# Patient Record
Sex: FEMALE | Race: WHITE | NOT HISPANIC OR LATINO | Employment: FULL TIME | URBAN - METROPOLITAN AREA
[De-identification: names, ages, dates, MRNs, and addresses within clinical notes are randomized per-mention and may not be internally consistent; named-entity substitution may affect disease eponyms.]

---

## 2018-03-05 ENCOUNTER — CONVERSION ENCOUNTER (OUTPATIENT)
Dept: FAMILY MEDICINE CLINIC | Facility: CLINIC | Age: 18
End: 2018-03-05

## 2018-03-05 ENCOUNTER — OFFICE VISIT CONVERTED (OUTPATIENT)
Dept: FAMILY MEDICINE CLINIC | Facility: CLINIC | Age: 18
End: 2018-03-05
Attending: NURSE PRACTITIONER

## 2018-08-07 ENCOUNTER — OFFICE VISIT CONVERTED (OUTPATIENT)
Dept: FAMILY MEDICINE CLINIC | Facility: CLINIC | Age: 18
End: 2018-08-07
Attending: PHYSICIAN ASSISTANT

## 2018-08-07 ENCOUNTER — CONVERSION ENCOUNTER (OUTPATIENT)
Dept: FAMILY MEDICINE CLINIC | Facility: CLINIC | Age: 18
End: 2018-08-07

## 2018-10-30 ENCOUNTER — OFFICE VISIT CONVERTED (OUTPATIENT)
Dept: FAMILY MEDICINE CLINIC | Facility: CLINIC | Age: 18
End: 2018-10-30
Attending: PHYSICIAN ASSISTANT

## 2019-01-15 ENCOUNTER — HOSPITAL ENCOUNTER (OUTPATIENT)
Dept: URGENT CARE | Facility: CLINIC | Age: 19
Discharge: HOME | End: 2019-01-15
Attending: FAMILY MEDICINE

## 2019-01-24 ENCOUNTER — HOSPITAL ENCOUNTER (OUTPATIENT)
Dept: SLEEP MEDICINE | Facility: HOSPITAL | Age: 19
Discharge: HOME | End: 2019-01-24
Attending: INTERNAL MEDICINE

## 2019-02-07 ENCOUNTER — OFFICE VISIT CONVERTED (OUTPATIENT)
Dept: FAMILY MEDICINE CLINIC | Facility: CLINIC | Age: 19
End: 2019-02-07
Attending: PHYSICIAN ASSISTANT

## 2019-02-07 ENCOUNTER — HOSPITAL ENCOUNTER (OUTPATIENT)
Dept: GENERAL RADIOLOGY | Facility: HOSPITAL | Age: 19
Discharge: HOME | End: 2019-02-07
Attending: PHYSICIAN ASSISTANT

## 2019-04-18 ENCOUNTER — HOSPITAL ENCOUNTER (OUTPATIENT)
Dept: SLEEP MEDICINE | Facility: HOSPITAL | Age: 19
Discharge: HOME | End: 2019-04-18
Attending: INTERNAL MEDICINE

## 2019-04-26 ENCOUNTER — HOSPITAL ENCOUNTER (OUTPATIENT)
Dept: URGENT CARE | Facility: CLINIC | Age: 19
Discharge: HOME | End: 2019-04-26

## 2019-04-28 LAB — BACTERIA UR CULT: NORMAL

## 2019-05-01 ENCOUNTER — HOSPITAL ENCOUNTER (OUTPATIENT)
Dept: URGENT CARE | Facility: CLINIC | Age: 19
Discharge: HOME | End: 2019-05-01
Attending: FAMILY MEDICINE

## 2019-05-05 ENCOUNTER — HOSPITAL ENCOUNTER (OUTPATIENT)
Dept: URGENT CARE | Facility: CLINIC | Age: 19
Discharge: HOME | End: 2019-05-05

## 2019-05-15 ENCOUNTER — HOSPITAL ENCOUNTER (OUTPATIENT)
Dept: URGENT CARE | Facility: CLINIC | Age: 19
Discharge: HOME | End: 2019-05-15
Attending: NURSE PRACTITIONER

## 2019-06-08 ENCOUNTER — APPOINTMENT (OUTPATIENT)
Dept: GENERAL RADIOLOGY | Facility: HOSPITAL | Age: 19
End: 2019-06-08

## 2019-06-08 ENCOUNTER — HOSPITAL ENCOUNTER (EMERGENCY)
Facility: HOSPITAL | Age: 19
Discharge: HOME | End: 2019-06-08
Attending: EMERGENCY MEDICINE | Admitting: EMERGENCY MEDICINE

## 2019-06-08 VITALS
OXYGEN SATURATION: 99 % | HEART RATE: 108 BPM | TEMPERATURE: 97.8 F | SYSTOLIC BLOOD PRESSURE: 112 MMHG | DIASTOLIC BLOOD PRESSURE: 68 MMHG | HEIGHT: 62 IN | RESPIRATION RATE: 18 BRPM | BODY MASS INDEX: 21.57 KG/M2 | WEIGHT: 117.2 LBS

## 2019-06-08 DIAGNOSIS — J45.901 MODERATE ASTHMA WITH ACUTE EXACERBATION, UNSPECIFIED WHETHER PERSISTENT: Primary | ICD-10-CM

## 2019-06-08 LAB
ALBUMIN SERPL-MCNC: 4.8 G/DL (ref 3.5–5.2)
ALBUMIN/GLOB SERPL: 2.1 G/DL
ALP SERPL-CCNC: 50 U/L (ref 39–117)
ALT SERPL W P-5'-P-CCNC: 9 U/L (ref 1–33)
ANION GAP SERPL CALCULATED.3IONS-SCNC: 14.4 MMOL/L
AST SERPL-CCNC: 16 U/L (ref 1–32)
BASOPHILS # BLD AUTO: 0.07 10*3/MM3 (ref 0–0.2)
BASOPHILS NFR BLD AUTO: 0.4 % (ref 0–1.5)
BILIRUB SERPL-MCNC: 0.3 MG/DL (ref 0.2–1.2)
BUN BLD-MCNC: 4 MG/DL (ref 6–20)
BUN/CREAT SERPL: 6.8 (ref 7–25)
CALCIUM SPEC-SCNC: 8.7 MG/DL (ref 8.6–10.5)
CHLORIDE SERPL-SCNC: 102 MMOL/L (ref 98–107)
CO2 SERPL-SCNC: 24.6 MMOL/L (ref 22–29)
CREAT BLD-MCNC: 0.59 MG/DL (ref 0.57–1)
DEPRECATED RDW RBC AUTO: 47 FL (ref 37–54)
EOSINOPHIL # BLD AUTO: 0.08 10*3/MM3 (ref 0–0.4)
EOSINOPHIL NFR BLD AUTO: 0.4 % (ref 0.3–6.2)
ERYTHROCYTE [DISTWIDTH] IN BLOOD BY AUTOMATED COUNT: 13.6 % (ref 12.3–15.4)
GFR SERPL CREATININE-BSD FRML MDRD: 129 ML/MIN/1.73
GLOBULIN UR ELPH-MCNC: 2.3 GM/DL
GLUCOSE BLD-MCNC: 88 MG/DL (ref 65–99)
HCT VFR BLD AUTO: 38.5 % (ref 34–46.6)
HGB BLD-MCNC: 13.1 G/DL (ref 12–15.9)
IMM GRANULOCYTES # BLD AUTO: 0.1 10*3/MM3 (ref 0–0.05)
IMM GRANULOCYTES NFR BLD AUTO: 0.6 % (ref 0–0.5)
LYMPHOCYTES # BLD AUTO: 1.63 10*3/MM3 (ref 0.7–3.1)
LYMPHOCYTES NFR BLD AUTO: 9.1 % (ref 19.6–45.3)
MCH RBC QN AUTO: 32 PG (ref 26.6–33)
MCHC RBC AUTO-ENTMCNC: 34 G/DL (ref 31.5–35.7)
MCV RBC AUTO: 93.9 FL (ref 79–97)
MONOCYTES # BLD AUTO: 1.02 10*3/MM3 (ref 0.1–0.9)
MONOCYTES NFR BLD AUTO: 5.7 % (ref 5–12)
NEUTROPHILS # BLD AUTO: 14.97 10*3/MM3 (ref 1.7–7)
NEUTROPHILS NFR BLD AUTO: 83.8 % (ref 42.7–76)
NRBC BLD AUTO-RTO: 0 /100 WBC (ref 0–0.2)
PLATELET # BLD AUTO: 244 10*3/MM3 (ref 140–450)
PMV BLD AUTO: 10.3 FL (ref 6–12)
POTASSIUM BLD-SCNC: 3.1 MMOL/L (ref 3.5–5.2)
PROT SERPL-MCNC: 7.1 G/DL (ref 6–8.5)
RBC # BLD AUTO: 4.1 10*6/MM3 (ref 3.77–5.28)
SODIUM BLD-SCNC: 141 MMOL/L (ref 136–145)
WBC NRBC COR # BLD: 17.87 10*3/MM3 (ref 3.4–10.8)

## 2019-06-08 PROCEDURE — 25010000002 METHYLPREDNISOLONE PER 125 MG: Performed by: EMERGENCY MEDICINE

## 2019-06-08 PROCEDURE — 71046 X-RAY EXAM CHEST 2 VIEWS: CPT

## 2019-06-08 PROCEDURE — 80053 COMPREHEN METABOLIC PANEL: CPT | Performed by: EMERGENCY MEDICINE

## 2019-06-08 PROCEDURE — 85025 COMPLETE CBC W/AUTO DIFF WBC: CPT | Performed by: EMERGENCY MEDICINE

## 2019-06-08 PROCEDURE — 94640 AIRWAY INHALATION TREATMENT: CPT

## 2019-06-08 PROCEDURE — 94799 UNLISTED PULMONARY SVC/PX: CPT

## 2019-06-08 PROCEDURE — 96374 THER/PROPH/DIAG INJ IV PUSH: CPT

## 2019-06-08 PROCEDURE — 99283 EMERGENCY DEPT VISIT LOW MDM: CPT

## 2019-06-08 RX ORDER — PREDNISONE 20 MG/1
20 TABLET ORAL 2 TIMES DAILY
Qty: 10 TABLET | Refills: 0 | OUTPATIENT
Start: 2019-06-08 | End: 2019-10-22

## 2019-06-08 RX ORDER — POTASSIUM CHLORIDE 750 MG/1
40 CAPSULE, EXTENDED RELEASE ORAL ONCE
Status: COMPLETED | OUTPATIENT
Start: 2019-06-08 | End: 2019-06-08

## 2019-06-08 RX ORDER — IPRATROPIUM BROMIDE AND ALBUTEROL SULFATE 2.5; .5 MG/3ML; MG/3ML
3 SOLUTION RESPIRATORY (INHALATION) ONCE
Status: COMPLETED | OUTPATIENT
Start: 2019-06-08 | End: 2019-06-08

## 2019-06-08 RX ORDER — SODIUM CHLORIDE 0.9 % (FLUSH) 0.9 %
10 SYRINGE (ML) INJECTION AS NEEDED
Status: DISCONTINUED | OUTPATIENT
Start: 2019-06-08 | End: 2019-06-08 | Stop reason: HOSPADM

## 2019-06-08 RX ORDER — METHYLPREDNISOLONE SODIUM SUCCINATE 125 MG/2ML
125 INJECTION, POWDER, LYOPHILIZED, FOR SOLUTION INTRAMUSCULAR; INTRAVENOUS ONCE
Status: COMPLETED | OUTPATIENT
Start: 2019-06-08 | End: 2019-06-08

## 2019-06-08 RX ORDER — ALBUTEROL SULFATE 2.5 MG/3ML
2.5 SOLUTION RESPIRATORY (INHALATION)
Status: DISCONTINUED | OUTPATIENT
Start: 2019-06-08 | End: 2019-06-08

## 2019-06-08 RX ADMIN — METHYLPREDNISOLONE SODIUM SUCCINATE 125 MG: 125 INJECTION, POWDER, FOR SOLUTION INTRAMUSCULAR; INTRAVENOUS at 07:25

## 2019-06-08 RX ADMIN — ALBUTEROL SULFATE 2.5 MG: 2.5 SOLUTION RESPIRATORY (INHALATION) at 08:25

## 2019-06-08 RX ADMIN — IPRATROPIUM BROMIDE AND ALBUTEROL SULFATE 3 ML: 2.5; .5 SOLUTION RESPIRATORY (INHALATION) at 08:21

## 2019-06-08 RX ADMIN — POTASSIUM CHLORIDE 40 MEQ: 750 CAPSULE, EXTENDED RELEASE ORAL at 09:42

## 2019-06-08 NOTE — ED PROVIDER NOTES
EMERGENCY DEPARTMENT ENCOUNTER    CHIEF COMPLAINT  Chief Complaint: SOA  History given by: patient  History limited by: none  Room Number: 15/15  PMD: Provider, No Known      HPI:  Pt is a 19 y.o. female who presents with hx of asthma complaining of SOA that began yesterday morning. Pt states sx worsened a few hours PTA. She states that sx are worse with exertion and laying flat.  Pt also complains of productive cough with clear sputum and sore throat. Pt denies N/V, rhinorrhea, BLE edema, BLE pain, rash, fever and chills. Pt states she has a nebulizer and inhalers at home which she has used multiple times with minimal relief. Pt is a former smoker and states she quit last year. Pt drinks EtOH occasionally. Pt denies ill contacts or any asthma triggers. Pt states there is no chance she could be pregnant.     Duration:  One day  Onset: gradual  Timing: constant  Location: chest  Intensity/Severity: moderate  Progression: worsening  Associated Symptoms: productive cough with clear sputum and sore throat  Aggravating Factors: exertion and laying flat  Alleviating Factors: none  Previous Episodes: hx of asthma  Treatment before arrival: Pt states she has a nebulizer and inhalers at home which she has used multiple times with minimal relief.    PAST MEDICAL HISTORY  Active Ambulatory Problems     Diagnosis Date Noted   • No Active Ambulatory Problems     Resolved Ambulatory Problems     Diagnosis Date Noted   • No Resolved Ambulatory Problems     No Additional Past Medical History       PAST SURGICAL HISTORY  No past surgical history on file.    FAMILY HISTORY  No family history on file.    SOCIAL HISTORY  Social History     Socioeconomic History   • Marital status:      Spouse name: Not on file   • Number of children: Not on file   • Years of education: Not on file   • Highest education level: Not on file       ALLERGIES  Patient has no known allergies.    REVIEW OF SYSTEMS  Review of Systems   Constitutional:  Negative for fever.   HENT: Positive for sore throat.    Eyes: Negative.    Respiratory: Positive for cough (productive-clear sputum) and shortness of breath.    Cardiovascular: Negative for chest pain.   Gastrointestinal: Negative for abdominal pain, diarrhea and vomiting.   Genitourinary: Negative for dysuria.   Musculoskeletal: Negative for neck pain.   Skin: Negative for rash.   Allergic/Immunologic: Negative.    Neurological: Negative for weakness, numbness and headaches.   Hematological: Negative.    Psychiatric/Behavioral: Negative.    All other systems reviewed and are negative.      PHYSICAL EXAM  ED Triage Vitals   Temp Heart Rate Resp BP SpO2   06/08/19 0644 06/08/19 0644 06/08/19 0644 06/08/19 0658 06/08/19 0644   97.8 °F (36.6 °C) (!) 134 22 135/81 96 %      Temp src Heart Rate Source Patient Position BP Location FiO2 (%)   06/08/19 0644 06/08/19 0821 06/08/19 0658 06/08/19 0658 --   Tympanic Monitor Lying Right arm          Physical Exam   Constitutional: She is oriented to person, place, and time. She appears distressed (mild).   HENT:   Head: Normocephalic and atraumatic.   Mouth/Throat: Oropharynx is clear and moist.   Eyes: EOM are normal. Pupils are equal, round, and reactive to light.   Neck: Normal range of motion. Neck supple.   Cardiovascular: Regular rhythm and normal heart sounds. Tachycardia present.   Pulmonary/Chest: Tachypnea noted. She is in respiratory distress (mild). She has wheezes (diffuse). She has rhonchi (diffuse).   Nasal flaring   Abdominal: Soft. Bowel sounds are normal. She exhibits no distension. There is no tenderness. There is no rebound and no guarding.   Musculoskeletal: Normal range of motion. She exhibits no edema (BLE) or tenderness (calf).   Lymphadenopathy:     She has no cervical adenopathy.   Neurological: She is alert and oriented to person, place, and time. She has normal sensation and normal strength.   Skin: Skin is warm and dry. No rash noted.   Psychiatric:  Mood and affect normal.   Nursing note and vitals reviewed.      LAB RESULTS  Lab Results (last 24 hours)     Procedure Component Value Units Date/Time    CBC & Differential [[id number]] Collected:  06/08/19 0710    Specimen:  Blood Updated:  06/08/19 0730    Narrative:       The following orders were created for panel order CBC & Differential.  Procedure                               Abnormality         Status                     ---------                               -----------         ------                     CBC Auto Differential[[id number]]        Abnormal            Final result                 Please view results for these tests on the individual orders.    Comprehensive Metabolic Panel [[id number]]  (Abnormal) Collected:  06/08/19 0710    Specimen:  Blood Updated:  06/08/19 0741     Glucose 88 mg/dL      BUN 4 mg/dL      Creatinine 0.59 mg/dL      Sodium 141 mmol/L      Potassium 3.1 mmol/L      Chloride 102 mmol/L      CO2 24.6 mmol/L      Calcium 8.7 mg/dL      Total Protein 7.1 g/dL      Albumin 4.80 g/dL      ALT (SGPT) 9 U/L      AST (SGOT) 16 U/L      Alkaline Phosphatase 50 U/L      Total Bilirubin 0.3 mg/dL      eGFR Non African Amer 129 mL/min/1.73      Globulin 2.3 gm/dL      A/G Ratio 2.1 g/dL      BUN/Creatinine Ratio 6.8     Anion Gap 14.4 mmol/L     Narrative:       GFR Normal >60  Chronic Kidney Disease <60  Kidney Failure <15    CBC Auto Differential [[id number]]  (Abnormal) Collected:  06/08/19 0710    Specimen:  Blood Updated:  06/08/19 0730     WBC 17.87 10*3/mm3      RBC 4.10 10*6/mm3      Hemoglobin 13.1 g/dL      Hematocrit 38.5 %      MCV 93.9 fL      MCH 32.0 pg      MCHC 34.0 g/dL      RDW 13.6 %      RDW-SD 47.0 fl      MPV 10.3 fL      Platelets 244 10*3/mm3      Neutrophil % 83.8 %      Lymphocyte % 9.1 %      Monocyte % 5.7 %      Eosinophil % 0.4 %      Basophil % 0.4 %      Immature Grans % 0.6 %      Neutrophils, Absolute 14.97 10*3/mm3      Lymphocytes, Absolute 1.63 10*3/mm3       Monocytes, Absolute 1.02 10*3/mm3      Eosinophils, Absolute 0.08 10*3/mm3      Basophils, Absolute 0.07 10*3/mm3      Immature Grans, Absolute 0.10 10*3/mm3      nRBC 0.0 /100 WBC           I ordered the above labs and reviewed the results    RADIOLOGY  XR Chest 2 View   Final Result   No focal pulmonary consolidation. Mild pulmonary   hyperinflation. Follow-up as clinical indications persist.       This report was finalized on 6/8/2019 9:16 AM by Dr. Suman Marmolejo M.D.               I ordered the above noted radiological studies. Interpreted by radiologist. Reviewed by me in PACS.       PROCEDURES  Procedures      PROGRESS AND CONSULTS     0705-Ordered lab work and CXR for further evaluation. Ordered solu-medrol, proventil, and duo-neb for SOA and wheezing.     0800-Ordered micro-k for hypokalemia.     0837-Rechecked pt. Pt is resting comfortably and states that sx are improved after 2 breathing treatments. On re-exam, lungs are clear and heart is tachycardic. Notified pt of WBC-17.87. Pt denies recent steroid use. Discussed the plan to review CXR. Pt understands and agrees with the plan, all questions answered.    0931-Rechecked pt. Pt is resting comfortably. Notified pt of CXR which is negative acute. On exam, lungs are clear without wheezing. Discussed the plan to discharge the pt home with prescriptions for prednisone. I instructed the pt to f/u with her PCP and continue to use her nebulizer and inhalers as instructed. Pt understands and agrees with the plan, all questions answered.        MEDICAL DECISION MAKING  Results were reviewed/discussed with the patient and they were also made aware of online access. Pt also made aware that some labs, such as cultures, will not be resulted during ER visit and follow up with PMD is necessary.     MDM  Number of Diagnoses or Management Options     Amount and/or Complexity of Data Reviewed  Clinical lab tests: reviewed and ordered  (WBC-17.87  creatinine-0.59  hemoglobin-13.1  potassium-3.1)  Tests in the radiology section of CPT®: reviewed and ordered (CXR-negative)  Decide to obtain previous medical records or to obtain history from someone other than the patient: yes  Independent visualization of images, tracings, or specimens: yes           DIAGNOSIS  Final diagnoses:   Moderate asthma with acute exacerbation, unspecified whether persistent       DISPOSITION  DISCHARGE    Patient discharged in stable condition.    Reviewed implications of results, diagnosis, meds, responsibility to follow up, warning signs and symptoms of possible worsening, potential complications and reasons to return to ER.    Patient/Family voiced understanding of above instructions.    Discussed plan for discharge, as there is no emergent indication for admission. Patient referred to primary care provider for BP management due to today's BP. Pt/family is agreeable and understands need for follow up and repeat testing.  Pt is aware that discharge does not mean that nothing is wrong but it indicates no emergency is present that requires admission and they must continue care with follow-up as given below or physician of their choice.     FOLLOW-UP  PATIENT LIAISON [place] [id number] [phone]  Schedule an appointment as soon as possible for a visit            Medication List      New Prescriptions    predniSONE 20 MG tablet  Commonly known as:  DELTASONE  Take 1 tablet by mouth 2 (Two) Times a Day.              Latest Documented Vital Signs:  As of 9:35 AM  BP- 131/79 HR- 106 Temp- 97.8 °F (36.6 °C) (Tympanic) O2 sat- 100%    --  Documentation assistance provided by vita Benites for MD Kingsley.  Information recorded by the scribe was done at my direction and has been verified and validated by me.                    Lila Benites  06/08/19 7197       Ramon Adhikari MD  06/08/19 8323

## 2019-06-08 NOTE — DISCHARGE INSTRUCTIONS
Take medications as directed and follow up with your family doctor.  To new home breathing treatments every 4-6 hours as needed.  Please return to the emergency department if symptoms worsen with increasing shortness of breath.

## 2019-06-08 NOTE — ED TRIAGE NOTES
Pt c/o shortness of breath, states she has had a recent chest cold that has made her asthma worse. Audible wheezing noted. Pt states she attempted inhalers and neb treatments at home with no relief of symptoms.

## 2019-06-24 ENCOUNTER — HOSPITAL ENCOUNTER (EMERGENCY)
Facility: HOSPITAL | Age: 19
Discharge: HOME | End: 2019-06-24
Attending: EMERGENCY MEDICINE | Admitting: EMERGENCY MEDICINE

## 2019-06-24 VITALS
HEART RATE: 101 BPM | TEMPERATURE: 97.4 F | SYSTOLIC BLOOD PRESSURE: 121 MMHG | DIASTOLIC BLOOD PRESSURE: 70 MMHG | BODY MASS INDEX: 22.08 KG/M2 | RESPIRATION RATE: 16 BRPM | HEIGHT: 62 IN | OXYGEN SATURATION: 98 % | WEIGHT: 120 LBS

## 2019-06-24 DIAGNOSIS — L02.32 BOIL OF BUTTOCK: Primary | ICD-10-CM

## 2019-06-24 PROCEDURE — 99282 EMERGENCY DEPT VISIT SF MDM: CPT

## 2019-06-24 RX ORDER — DEXTROAMPHETAMINE SACCHARATE, AMPHETAMINE ASPARTATE, DEXTROAMPHETAMINE SULFATE AND AMPHETAMINE SULFATE 2.5; 2.5; 2.5; 2.5 MG/1; MG/1; MG/1; MG/1
20 TABLET ORAL 3 TIMES DAILY
COMMUNITY
End: 2020-04-19 | Stop reason: HOSPADM

## 2019-06-24 RX ORDER — SULFAMETHOXAZOLE AND TRIMETHOPRIM 800; 160 MG/1; MG/1
2 TABLET ORAL 2 TIMES DAILY
Qty: 40 TABLET | Refills: 0 | Status: SHIPPED | OUTPATIENT
Start: 2019-06-24 | End: 2019-08-02

## 2019-06-25 ENCOUNTER — HOSPITAL ENCOUNTER (OUTPATIENT)
Dept: URGENT CARE | Facility: CLINIC | Age: 19
Discharge: HOME | End: 2019-06-25

## 2019-06-27 ENCOUNTER — HOSPITAL ENCOUNTER (OUTPATIENT)
Dept: URGENT CARE | Facility: CLINIC | Age: 19
Discharge: HOME | End: 2019-06-27

## 2019-08-02 ENCOUNTER — HOSPITAL ENCOUNTER (EMERGENCY)
Facility: HOSPITAL | Age: 19
Discharge: HOME | End: 2019-08-02
Attending: EMERGENCY MEDICINE | Admitting: EMERGENCY MEDICINE

## 2019-08-02 VITALS
RESPIRATION RATE: 16 BRPM | HEIGHT: 62 IN | DIASTOLIC BLOOD PRESSURE: 74 MMHG | OXYGEN SATURATION: 99 % | TEMPERATURE: 98 F | WEIGHT: 120.7 LBS | BODY MASS INDEX: 22.21 KG/M2 | SYSTOLIC BLOOD PRESSURE: 112 MMHG | HEART RATE: 75 BPM

## 2019-08-02 DIAGNOSIS — R11.2 NON-INTRACTABLE VOMITING WITH NAUSEA, UNSPECIFIED VOMITING TYPE: Primary | ICD-10-CM

## 2019-08-02 LAB
ALBUMIN SERPL-MCNC: 4.7 G/DL (ref 3.5–5.2)
ALBUMIN/GLOB SERPL: 2.4 G/DL
ALP SERPL-CCNC: 42 U/L (ref 39–117)
ALT SERPL W P-5'-P-CCNC: 7 U/L (ref 1–33)
ANION GAP SERPL CALCULATED.3IONS-SCNC: 10.8 MMOL/L (ref 5–15)
AST SERPL-CCNC: 14 U/L (ref 1–32)
BACTERIA UR QL AUTO: ABNORMAL /HPF
BASOPHILS # BLD AUTO: 0.04 10*3/MM3 (ref 0–0.2)
BASOPHILS NFR BLD AUTO: 0.6 % (ref 0–1.5)
BILIRUB SERPL-MCNC: 0.7 MG/DL (ref 0.2–1.2)
BILIRUB UR QL STRIP: NEGATIVE
BUN BLD-MCNC: 7 MG/DL (ref 6–20)
BUN/CREAT SERPL: 9.2 (ref 7–25)
CALCIUM SPEC-SCNC: 9 MG/DL (ref 8.6–10.5)
CHLORIDE SERPL-SCNC: 106 MMOL/L (ref 98–107)
CLARITY UR: CLEAR
CO2 SERPL-SCNC: 26.2 MMOL/L (ref 22–29)
COLOR UR: YELLOW
CREAT BLD-MCNC: 0.76 MG/DL (ref 0.57–1)
DEPRECATED RDW RBC AUTO: 41.1 FL (ref 37–54)
EOSINOPHIL # BLD AUTO: 0.21 10*3/MM3 (ref 0–0.4)
EOSINOPHIL NFR BLD AUTO: 3.4 % (ref 0.3–6.2)
ERYTHROCYTE [DISTWIDTH] IN BLOOD BY AUTOMATED COUNT: 12.3 % (ref 12.3–15.4)
GFR SERPL CREATININE-BSD FRML MDRD: 96 ML/MIN/1.73
GLOBULIN UR ELPH-MCNC: 2 GM/DL
GLUCOSE BLD-MCNC: 83 MG/DL (ref 65–99)
GLUCOSE UR STRIP-MCNC: NEGATIVE MG/DL
HCG SERPL QL: NEGATIVE
HCT VFR BLD AUTO: 39.8 % (ref 34–46.6)
HGB BLD-MCNC: 13.7 G/DL (ref 12–15.9)
HGB UR QL STRIP.AUTO: ABNORMAL
HOLD SPECIMEN: NORMAL
HYALINE CASTS UR QL AUTO: ABNORMAL /LPF
IMM GRANULOCYTES # BLD AUTO: 0.01 10*3/MM3 (ref 0–0.05)
IMM GRANULOCYTES NFR BLD AUTO: 0.2 % (ref 0–0.5)
KETONES UR QL STRIP: NEGATIVE
LEUKOCYTE ESTERASE UR QL STRIP.AUTO: NEGATIVE
LIPASE SERPL-CCNC: 24 U/L (ref 13–60)
LYMPHOCYTES # BLD AUTO: 2.46 10*3/MM3 (ref 0.7–3.1)
LYMPHOCYTES NFR BLD AUTO: 39.7 % (ref 19.6–45.3)
MCH RBC QN AUTO: 31.4 PG (ref 26.6–33)
MCHC RBC AUTO-ENTMCNC: 34.4 G/DL (ref 31.5–35.7)
MCV RBC AUTO: 91.1 FL (ref 79–97)
MONOCYTES # BLD AUTO: 0.48 10*3/MM3 (ref 0.1–0.9)
MONOCYTES NFR BLD AUTO: 7.7 % (ref 5–12)
NEUTROPHILS # BLD AUTO: 3 10*3/MM3 (ref 1.7–7)
NEUTROPHILS NFR BLD AUTO: 48.4 % (ref 42.7–76)
NITRITE UR QL STRIP: NEGATIVE
NRBC BLD AUTO-RTO: 0 /100 WBC (ref 0–0.2)
PH UR STRIP.AUTO: 6.5 [PH] (ref 5–8)
PLATELET # BLD AUTO: 241 10*3/MM3 (ref 140–450)
PMV BLD AUTO: 9.9 FL (ref 6–12)
POTASSIUM BLD-SCNC: 3.7 MMOL/L (ref 3.5–5.2)
PROT SERPL-MCNC: 6.7 G/DL (ref 6–8.5)
PROT UR QL STRIP: NEGATIVE
RBC # BLD AUTO: 4.37 10*6/MM3 (ref 3.77–5.28)
RBC # UR: ABNORMAL /HPF
REF LAB TEST METHOD: ABNORMAL
SODIUM BLD-SCNC: 143 MMOL/L (ref 136–145)
SP GR UR STRIP: >=1.03 (ref 1–1.03)
SQUAMOUS #/AREA URNS HPF: ABNORMAL /HPF
UROBILINOGEN UR QL STRIP: ABNORMAL
WBC NRBC COR # BLD: 6.2 10*3/MM3 (ref 3.4–10.8)
WBC UR QL AUTO: ABNORMAL /HPF
WHOLE BLOOD HOLD SPECIMEN: NORMAL
WHOLE BLOOD HOLD SPECIMEN: NORMAL

## 2019-08-02 PROCEDURE — 80053 COMPREHEN METABOLIC PANEL: CPT | Performed by: EMERGENCY MEDICINE

## 2019-08-02 PROCEDURE — 25010000002 ONDANSETRON PER 1 MG: Performed by: EMERGENCY MEDICINE

## 2019-08-02 PROCEDURE — 83690 ASSAY OF LIPASE: CPT | Performed by: EMERGENCY MEDICINE

## 2019-08-02 PROCEDURE — 84703 CHORIONIC GONADOTROPIN ASSAY: CPT | Performed by: EMERGENCY MEDICINE

## 2019-08-02 PROCEDURE — 99284 EMERGENCY DEPT VISIT MOD MDM: CPT

## 2019-08-02 PROCEDURE — 81001 URINALYSIS AUTO W/SCOPE: CPT | Performed by: EMERGENCY MEDICINE

## 2019-08-02 PROCEDURE — 96374 THER/PROPH/DIAG INJ IV PUSH: CPT

## 2019-08-02 PROCEDURE — 85025 COMPLETE CBC W/AUTO DIFF WBC: CPT | Performed by: EMERGENCY MEDICINE

## 2019-08-02 PROCEDURE — 96361 HYDRATE IV INFUSION ADD-ON: CPT

## 2019-08-02 RX ORDER — ONDANSETRON 2 MG/ML
4 INJECTION INTRAMUSCULAR; INTRAVENOUS ONCE
Status: COMPLETED | OUTPATIENT
Start: 2019-08-02 | End: 2019-08-02

## 2019-08-02 RX ORDER — ALBUTEROL SULFATE 90 UG/1
2 AEROSOL, METERED RESPIRATORY (INHALATION) EVERY 4 HOURS PRN
COMMUNITY
End: 2019-10-22 | Stop reason: SDUPTHER

## 2019-08-02 RX ORDER — SODIUM CHLORIDE 0.9 % (FLUSH) 0.9 %
10 SYRINGE (ML) INJECTION AS NEEDED
Status: DISCONTINUED | OUTPATIENT
Start: 2019-08-02 | End: 2019-08-02 | Stop reason: HOSPADM

## 2019-08-02 RX ORDER — ONDANSETRON 4 MG/1
4 TABLET, FILM COATED ORAL EVERY 6 HOURS PRN
Qty: 12 TABLET | Refills: 0 | Status: SHIPPED | OUTPATIENT
Start: 2019-08-02 | End: 2020-06-27

## 2019-08-02 RX ADMIN — SODIUM CHLORIDE, POTASSIUM CHLORIDE, SODIUM LACTATE AND CALCIUM CHLORIDE 1000 ML: 600; 310; 30; 20 INJECTION, SOLUTION INTRAVENOUS at 07:18

## 2019-08-02 RX ADMIN — ONDANSETRON 4 MG: 2 INJECTION INTRAMUSCULAR; INTRAVENOUS at 07:15

## 2019-08-02 NOTE — ED PROVIDER NOTES
EMERGENCY DEPARTMENT ENCOUNTER    CHIEF COMPLAINT  Chief Complaint: vomiting  History given by: patient  History limited by: none  Room Number: 15/15  PMD: Provider, No Known      HPI:  Pt is a 19 y.o. female who presents complaining of N/V that began at 0500. Pt states she had upper abd pain which was resolved after vomiting. Pt states she has only had one episode of vomiting. Pt denies diarrhea, cough, fever, urinary sx, and ill contacts. Pt states she ate [employer] last night. Pt has hx of asthma and narcolepsy. LNMP: one week ago.     Duration:  2 hours  Onset: gradual  Timing: constant  Location: GI  Intensity/Severity: moderate  Progression: improved  Associated Symptoms: nausea, upper abd pain  Treatment before arrival: none    PAST MEDICAL HISTORY  Active Ambulatory Problems     Diagnosis Date Noted   • No Active Ambulatory Problems     Resolved Ambulatory Problems     Diagnosis Date Noted   • No Resolved Ambulatory Problems     Past Medical History:   Diagnosis Date   • Asthma    • Narcolepsy    • Scoliosis        PAST SURGICAL HISTORY  History reviewed. No pertinent surgical history.    FAMILY HISTORY  History reviewed. No pertinent family history.    SOCIAL HISTORY  Social History     Socioeconomic History   • Marital status:      Spouse name: Not on file   • Number of children: Not on file   • Years of education: Not on file   • Highest education level: Not on file   Tobacco Use   • Smoking status: Current Some Day Smoker     Packs/day: 0.25   • Smokeless tobacco: Never Used   Substance and Sexual Activity   • Alcohol use: Yes     Comment: occationally    • Drug use: No   • Sexual activity: Yes     Birth control/protection: None       ALLERGIES  Patient has no known allergies.    REVIEW OF SYSTEMS  Review of Systems   Constitutional: Negative for fever.   HENT: Negative for sore throat.    Eyes: Negative.    Respiratory: Negative for cough and shortness of breath.    Cardiovascular: Negative  for chest pain.   Gastrointestinal: Positive for abdominal pain (upper), nausea and vomiting. Negative for diarrhea.   Genitourinary: Negative for dysuria.   Musculoskeletal: Negative for neck pain.   Skin: Negative for rash.   Allergic/Immunologic: Negative.    Neurological: Negative for weakness, numbness and headaches.   Hematological: Negative.    Psychiatric/Behavioral: Negative.    All other systems reviewed and are negative.      PHYSICAL EXAM  ED Triage Vitals   Temp Heart Rate Resp BP SpO2   08/02/19 0634 08/02/19 0634 08/02/19 0634 08/02/19 0642 08/02/19 0634   97.7 °F (36.5 °C) 117 18 125/77 100 %      Temp src Heart Rate Source Patient Position BP Location FiO2 (%)   -- 08/02/19 0642 08/02/19 0642 08/02/19 0642 --    Monitor Sitting Right arm        Physical Exam   Constitutional: She is oriented to person, place, and time. No distress.   HENT:   Head: Normocephalic and atraumatic.   Moist mucous membranes   Eyes: EOM are normal. Pupils are equal, round, and reactive to light.   Neck: Normal range of motion. Neck supple.   Cardiovascular: Normal rate, regular rhythm and normal heart sounds.   Pulmonary/Chest: Effort normal and breath sounds normal. No respiratory distress.   Abdominal: Soft. Bowel sounds are normal. She exhibits no distension. There is no tenderness. There is no rebound and no guarding.   Benign    Musculoskeletal: Normal range of motion. She exhibits no edema.   Neurological: She is alert and oriented to person, place, and time. She has normal sensation and normal strength.   Skin: Skin is warm and dry. No rash noted.   Psychiatric: Mood and affect normal.   Nursing note and vitals reviewed.      LAB RESULTS  Lab Results (last 24 hours)     Procedure Component Value Units Date/Time    CBC & Differential [[id number]] Collected:  08/02/19 0654    Specimen:  Blood Updated:  08/02/19 0710    Narrative:       The following orders were created for panel order CBC & Differential.  Procedure                                Abnormality         Status                     ---------                               -----------         ------                     CBC Auto Differential[[id number]]        Normal              Final result                 Please view results for these tests on the individual orders.    CBC Auto Differential [[id number]]  (Normal) Collected:  08/02/19 0654    Specimen:  Blood Updated:  08/02/19 0710     WBC 6.20 10*3/mm3      RBC 4.37 10*6/mm3      Hemoglobin 13.7 g/dL      Hematocrit 39.8 %      MCV 91.1 fL      MCH 31.4 pg      MCHC 34.4 g/dL      RDW 12.3 %      RDW-SD 41.1 fl      MPV 9.9 fL      Platelets 241 10*3/mm3      Neutrophil % 48.4 %      Lymphocyte % 39.7 %      Monocyte % 7.7 %      Eosinophil % 3.4 %      Basophil % 0.6 %      Immature Grans % 0.2 %      Neutrophils, Absolute 3.00 10*3/mm3      Lymphocytes, Absolute 2.46 10*3/mm3      Monocytes, Absolute 0.48 10*3/mm3      Eosinophils, Absolute 0.21 10*3/mm3      Basophils, Absolute 0.04 10*3/mm3      Immature Grans, Absolute 0.01 10*3/mm3      nRBC 0.0 /100 WBC     Comprehensive Metabolic Panel [[id number]] Collected:  08/02/19 0655    Specimen:  Blood Updated:  08/02/19 0725     Glucose 83 mg/dL      BUN 7 mg/dL      Creatinine 0.76 mg/dL      Sodium 143 mmol/L      Potassium 3.7 mmol/L      Chloride 106 mmol/L      CO2 26.2 mmol/L      Calcium 9.0 mg/dL      Total Protein 6.7 g/dL      Albumin 4.70 g/dL      ALT (SGPT) 7 U/L      AST (SGOT) 14 U/L      Alkaline Phosphatase 42 U/L      Total Bilirubin 0.7 mg/dL      eGFR Non African Amer 96 mL/min/1.73      Globulin 2.0 gm/dL      A/G Ratio 2.4 g/dL      BUN/Creatinine Ratio 9.2     Anion Gap 10.8 mmol/L     Narrative:       GFR Normal >60  Chronic Kidney Disease <60  Kidney Failure <15    Lipase [[id number]]  (Normal) Collected:  08/02/19 0655    Specimen:  Blood Updated:  08/02/19 0725     Lipase 24 U/L     Urinalysis With Microscopic If Indicated (No Culture) - Urine,  Clean Catch [[id number]]  (Abnormal) Collected:  08/02/19 0655    Specimen:  Urine, Clean Catch Updated:  08/02/19 0724     Color, UA Yellow     Appearance, UA Clear     pH, UA 6.5     Specific Gravity, UA >=1.030     Glucose, UA Negative     Ketones, UA Negative     Bilirubin, UA Negative     Blood, UA Trace     Protein, UA Negative     Leuk Esterase, UA Negative     Nitrite, UA Negative     Urobilinogen, UA 1.0 E.U./dL    hCG, Serum, Qualitative [[id number]]  (Normal) Collected:  08/02/19 0655    Specimen:  Blood Updated:  08/02/19 0733     HCG Qualitative Negative    Urinalysis, Microscopic Only - Urine, Clean Catch [[id number]] Collected:  08/02/19 0655    Specimen:  Urine, Clean Catch Updated:  08/02/19 0711          I ordered the above labs and reviewed the results      PROCEDURES  Procedures      PROGRESS AND CONSULTS     0653-Ordered lab work for further evaluation.     0821-Rechecked pt. Pt is resting comfortably. Pt states sx have resolved and that she feels better. Notified pt of unremarkable lab work; UA-negative, hCG-negative, WBC-6.20, and creatinine-0.76. Discussed the plan to discharge the pt home with prescriptions for zofran. I instructed the pt to f/u with her PCP for further care and to [facility] for any new or worsening sx. Pt understands and agrees with the plan, all questions answered.    MEDICAL DECISION MAKING  Results were reviewed/discussed with the patient and they were also made aware of online access. Pt also made aware that some labs, such as cultures, will not be resulted during ER visit and follow up with PMD is necessary.     MDM  Number of Diagnoses or Management Options     Amount and/or Complexity of Data Reviewed  Clinical lab tests: reviewed and ordered (UA-negative  hCG-negative  WBC-6.20  creatinine-0.76)  Decide to obtain previous medical records or to obtain history from someone other than the patient: yes           DIAGNOSIS  Final diagnoses:   Non-intractable vomiting with  nausea, unspecified vomiting type       DISPOSITION  DISCHARGE    Patient discharged in stable condition.    Reviewed implications of results, diagnosis, meds, responsibility to follow up, warning signs and symptoms of possible worsening, potential complications and reasons to return to ER.    Patient/Family voiced understanding of above instructions.    Discussed plan for discharge, as there is no emergent indication for admission. Patient referred to primary care provider for BP management due to today's BP. Pt/family is agreeable and understands need for follow up and repeat testing.  Pt is aware that discharge does not mean that nothing is wrong but it indicates no emergency is present that requires admission and they must continue care with follow-up as given below or physician of their choice.     FOLLOW-UP  your doctor    Go to   As needed, If symptoms persist         Medication List      New Prescriptions    ondansetron 4 MG tablet  Commonly known as:  ZOFRAN  Take 1 tablet by mouth Every 6 (Six) Hours As Needed for Nausea or   Vomiting.                Latest Documented Vital Signs:  As of 8:23 AM  BP- 108/70 HR- 72 Temp- 97.7 °F (36.5 °C) O2 sat- 100%    --  Documentation assistance provided by vita Benites for MD Frances.  Information recorded by the scribe was done at my direction and has been verified and validated by me.     Lila Benites  08/02/19 8111       Rodolfo Montero MD  08/02/19 3902

## 2019-08-02 NOTE — ED TRIAGE NOTES
Pt with upper abdominal pain since 5am this morning.  Pt vomiting at triage. Denies abdominal surgeries.

## 2019-09-03 ENCOUNTER — OFFICE VISIT CONVERTED (OUTPATIENT)
Dept: FAMILY MEDICINE CLINIC | Facility: CLINIC | Age: 19
End: 2019-09-03
Attending: PHYSICIAN ASSISTANT

## 2019-09-12 ENCOUNTER — HOSPITAL ENCOUNTER (EMERGENCY)
Facility: HOSPITAL | Age: 19
Discharge: HOME | End: 2019-09-13
Attending: EMERGENCY MEDICINE | Admitting: EMERGENCY MEDICINE

## 2019-09-12 DIAGNOSIS — R19.7 DIARRHEA, UNSPECIFIED TYPE: Primary | ICD-10-CM

## 2019-09-12 PROCEDURE — 99283 EMERGENCY DEPT VISIT LOW MDM: CPT

## 2019-09-12 RX ORDER — PROMETHAZINE HYDROCHLORIDE 25 MG/1
25 TABLET ORAL EVERY 6 HOURS PRN
COMMUNITY
End: 2020-06-27

## 2019-09-13 VITALS
RESPIRATION RATE: 16 BRPM | TEMPERATURE: 98.3 F | WEIGHT: 130 LBS | BODY MASS INDEX: 23.92 KG/M2 | SYSTOLIC BLOOD PRESSURE: 112 MMHG | HEIGHT: 62 IN | OXYGEN SATURATION: 98 % | DIASTOLIC BLOOD PRESSURE: 62 MMHG | HEART RATE: 80 BPM

## 2019-09-13 LAB
ALBUMIN SERPL-MCNC: 4.5 G/DL (ref 3.5–5.2)
ALBUMIN/GLOB SERPL: 2.6 G/DL
ALP SERPL-CCNC: 41 U/L (ref 39–117)
ALT SERPL W P-5'-P-CCNC: 6 U/L (ref 1–33)
ANION GAP SERPL CALCULATED.3IONS-SCNC: 10.5 MMOL/L (ref 5–15)
AST SERPL-CCNC: 11 U/L (ref 1–32)
BASOPHILS # BLD AUTO: 0.05 10*3/MM3 (ref 0–0.2)
BASOPHILS NFR BLD AUTO: 0.4 % (ref 0–1.5)
BILIRUB SERPL-MCNC: 0.3 MG/DL (ref 0.2–1.2)
BUN BLD-MCNC: 8 MG/DL (ref 6–20)
BUN/CREAT SERPL: 14.8 (ref 7–25)
CALCIUM SPEC-SCNC: 9.3 MG/DL (ref 8.6–10.5)
CHLORIDE SERPL-SCNC: 101 MMOL/L (ref 98–107)
CO2 SERPL-SCNC: 24.5 MMOL/L (ref 22–29)
CREAT BLD-MCNC: 0.54 MG/DL (ref 0.57–1)
DEPRECATED RDW RBC AUTO: 39.4 FL (ref 37–54)
EOSINOPHIL # BLD AUTO: 0.31 10*3/MM3 (ref 0–0.4)
EOSINOPHIL NFR BLD AUTO: 2.8 % (ref 0.3–6.2)
ERYTHROCYTE [DISTWIDTH] IN BLOOD BY AUTOMATED COUNT: 11.8 % (ref 12.3–15.4)
GFR SERPL CREATININE-BSD FRML MDRD: 143 ML/MIN/1.73
GLOBULIN UR ELPH-MCNC: 1.7 GM/DL
GLUCOSE BLD-MCNC: 91 MG/DL (ref 65–99)
HCG SERPL QL: POSITIVE
HCT VFR BLD AUTO: 32.3 % (ref 34–46.6)
HGB BLD-MCNC: 11.5 G/DL (ref 12–15.9)
HOLD SPECIMEN: NORMAL
IMM GRANULOCYTES # BLD AUTO: 0.04 10*3/MM3 (ref 0–0.05)
IMM GRANULOCYTES NFR BLD AUTO: 0.4 % (ref 0–0.5)
LYMPHOCYTES # BLD AUTO: 2.48 10*3/MM3 (ref 0.7–3.1)
LYMPHOCYTES NFR BLD AUTO: 22.2 % (ref 19.6–45.3)
MCH RBC QN AUTO: 32.4 PG (ref 26.6–33)
MCHC RBC AUTO-ENTMCNC: 35.6 G/DL (ref 31.5–35.7)
MCV RBC AUTO: 91 FL (ref 79–97)
MONOCYTES # BLD AUTO: 0.81 10*3/MM3 (ref 0.1–0.9)
MONOCYTES NFR BLD AUTO: 7.3 % (ref 5–12)
NEUTROPHILS # BLD AUTO: 7.47 10*3/MM3 (ref 1.7–7)
NEUTROPHILS NFR BLD AUTO: 66.9 % (ref 42.7–76)
NRBC BLD AUTO-RTO: 0 /100 WBC (ref 0–0.2)
PLATELET # BLD AUTO: 235 10*3/MM3 (ref 140–450)
PMV BLD AUTO: 10 FL (ref 6–12)
POTASSIUM BLD-SCNC: 3.6 MMOL/L (ref 3.5–5.2)
PROT SERPL-MCNC: 6.2 G/DL (ref 6–8.5)
RBC # BLD AUTO: 3.55 10*6/MM3 (ref 3.77–5.28)
SODIUM BLD-SCNC: 136 MMOL/L (ref 136–145)
WBC NRBC COR # BLD: 11.16 10*3/MM3 (ref 3.4–10.8)
WHOLE BLOOD HOLD SPECIMEN: NORMAL
WHOLE BLOOD HOLD SPECIMEN: NORMAL

## 2019-09-13 PROCEDURE — 80053 COMPREHEN METABOLIC PANEL: CPT | Performed by: EMERGENCY MEDICINE

## 2019-09-13 PROCEDURE — 85025 COMPLETE CBC W/AUTO DIFF WBC: CPT | Performed by: EMERGENCY MEDICINE

## 2019-09-13 PROCEDURE — 84703 CHORIONIC GONADOTROPIN ASSAY: CPT | Performed by: EMERGENCY MEDICINE

## 2019-09-13 NOTE — ED NOTES
Pt c/o diarrhea that started today after taking a dose of antibiotic for chlamydia around 3pm. States had approx 4 episodes of diarrhea. Denies blood in stool. States also has some mild ABD pain and nausea, but states she is approx 8 wks pregnant and this is normal for her pregnancy. Denies ABD cramping, vaginal bleeding, or other pregnancy-related concerns. Denies vomiting or fever.    VSS, NAD, A&O x4. Respirations even and unlabored. Denies any other acute complaints. Call light in reach. Will continue to monitor. MD to treat.     Macy Arreguin, RN  09/13/19 0015

## 2019-09-13 NOTE — ED TRIAGE NOTES
Pt from home with c/o diarrhea that strated 30 min PTA, pt states one time dose of Azithromycin at 3pm prescribed for Chlamydia. Pt also c/o nausea but states she is pregnant but not sure how far along. Last period 2 months ago. Pt concerned about c-diff infection but has no hx.

## 2019-09-13 NOTE — ED PROVIDER NOTES
EMERGENCY DEPARTMENT ENCOUNTER    CHIEF COMPLAINT  Chief Complaint: Diarrhea  History given by: Patient  History limited by: Nothing  Room Number: 18/18  PMD: Provider, No Known      HPI:  Pt is a 19 y.o. female who presents complaining of 4 episodes of diarrhea that began earlier today. Pt is also c/o abdominal cramping. Pt denies fever, vaginal bleeding, and vaginal discharge. Pt reports she took a one time dose of Azithromycin today and when the diarrhea began she was concerned she had c-diff. Pt reports being pregnant but reports she is unsure how far along she is.     Duration: Several hours  Onset: Gradual  Timing: Intermittent   Location: GI  Radiation: N/a  Quality: Diarrhea  Intensity/Severity: Moderate  Progression: Unchanged  Associated Symptoms: Abdominal cramping  Aggravating Factors: None  Alleviating Factors: None  Previous Episodes: None  Treatment before arrival: None    PAST MEDICAL HISTORY  Active Ambulatory Problems     Diagnosis Date Noted   • No Active Ambulatory Problems     Resolved Ambulatory Problems     Diagnosis Date Noted   • No Resolved Ambulatory Problems     Past Medical History:   Diagnosis Date   • Asthma    • Narcolepsy    • Scoliosis        PAST SURGICAL HISTORY  History reviewed. No pertinent surgical history.    FAMILY HISTORY  History reviewed. No pertinent family history.    SOCIAL HISTORY  Social History     Socioeconomic History   • Marital status:      Spouse name: Not on file   • Number of children: Not on file   • Years of education: Not on file   • Highest education level: Not on file   Tobacco Use   • Smoking status: Current Some Day Smoker     Packs/day: 0.25   • Smokeless tobacco: Never Used   Substance and Sexual Activity   • Alcohol use: Yes     Comment: occationally    • Drug use: No   • Sexual activity: Yes     Birth control/protection: None       ALLERGIES  Patient has no known allergies.    REVIEW OF SYSTEMS  Review of Systems   Constitutional: Negative  for fever.   HENT: Negative for sore throat.    Eyes: Negative.    Respiratory: Negative for cough and shortness of breath.    Cardiovascular: Negative for chest pain.   Gastrointestinal: Positive for abdominal pain (cramping) and diarrhea. Negative for vomiting.   Genitourinary: Negative for dysuria, vaginal bleeding and vaginal discharge.   Musculoskeletal: Negative for neck pain.   Skin: Negative for rash.   Allergic/Immunologic: Negative.    Neurological: Negative for weakness, numbness and headaches.   Hematological: Negative.    Psychiatric/Behavioral: Negative.    All other systems reviewed and are negative.      PHYSICAL EXAM  ED Triage Vitals [09/12/19 2252]   Temp Heart Rate Resp BP SpO2   98.3 °F (36.8 °C) 99 18 -- 100 %      Temp src Heart Rate Source Patient Position BP Location FiO2 (%)   Tympanic Monitor -- -- --       Physical Exam   Constitutional: She is oriented to person, place, and time. No distress.   HENT:   Head: Normocephalic and atraumatic.   Eyes: EOM are normal. Pupils are equal, round, and reactive to light.   Neck: Normal range of motion. Neck supple.   Cardiovascular: Normal rate, regular rhythm and normal heart sounds.   Pulmonary/Chest: Effort normal and breath sounds normal. No respiratory distress.   Abdominal: Soft. There is no tenderness. There is no rebound and no guarding.   Musculoskeletal: Normal range of motion. She exhibits no edema.   Neurological: She is alert and oriented to person, place, and time. She has normal sensation and normal strength.   Skin: Skin is warm and dry. No rash noted.   Psychiatric: Mood and affect normal.   Nursing note and vitals reviewed.      LAB RESULTS  Lab Results (last 24 hours)     Procedure Component Value Units Date/Time    CBC & Differential [[id number]] Collected:  09/13/19 0027    Specimen:  Blood Updated:  09/13/19 0040    Narrative:       The following orders were created for panel order CBC & Differential.  Procedure                                Abnormality         Status                     ---------                               -----------         ------                     CBC Auto Differential[[id number]]        Abnormal            Final result                 Please view results for these tests on the individual orders.    Comprehensive Metabolic Panel [[id number]]  (Abnormal) Collected:  09/13/19 0027    Specimen:  Blood Updated:  09/13/19 0055     Glucose 91 mg/dL      BUN 8 mg/dL      Creatinine 0.54 mg/dL      Sodium 136 mmol/L      Potassium 3.6 mmol/L      Chloride 101 mmol/L      CO2 24.5 mmol/L      Calcium 9.3 mg/dL      Total Protein 6.2 g/dL      Albumin 4.50 g/dL      ALT (SGPT) 6 U/L      AST (SGOT) 11 U/L      Alkaline Phosphatase 41 U/L      Total Bilirubin 0.3 mg/dL      eGFR Non African Amer 143 mL/min/1.73      Globulin 1.7 gm/dL      A/G Ratio 2.6 g/dL      BUN/Creatinine Ratio 14.8     Anion Gap 10.5 mmol/L     Narrative:       GFR Normal >60  Chronic Kidney Disease <60  Kidney Failure <15    hCG, Serum, Qualitative [[id number]]  (Abnormal) Collected:  09/13/19 0027    Specimen:  Blood Updated:  09/13/19 0115     HCG Qualitative Positive    CBC Auto Differential [[id number]]  (Abnormal) Collected:  09/13/19 0027    Specimen:  Blood Updated:  09/13/19 0040     WBC 11.16 10*3/mm3      RBC 3.55 10*6/mm3      Hemoglobin 11.5 g/dL      Hematocrit 32.3 %      MCV 91.0 fL      MCH 32.4 pg      MCHC 35.6 g/dL      RDW 11.8 %      RDW-SD 39.4 fl      MPV 10.0 fL      Platelets 235 10*3/mm3      Neutrophil % 66.9 %      Lymphocyte % 22.2 %      Monocyte % 7.3 %      Eosinophil % 2.8 %      Basophil % 0.4 %      Immature Grans % 0.4 %      Neutrophils, Absolute 7.47 10*3/mm3      Lymphocytes, Absolute 2.48 10*3/mm3      Monocytes, Absolute 0.81 10*3/mm3      Eosinophils, Absolute 0.31 10*3/mm3      Basophils, Absolute 0.05 10*3/mm3      Immature Grans, Absolute 0.04 10*3/mm3      nRBC 0.0 /100 WBC           I ordered the above labs  and reviewed the results    RADIOLOGY  No orders to display          PROCEDURES  Procedures      PROGRESS AND CONSULTS   0125:  Pt feels better without further episodes of diarrhea.  Discussed lab results.  Stated that this is unlikely to be an infectious (c.dif) cause of diarrhea secondary to the timing of the diarrhea in relation to the antibiotic.  Pt tolerated oral fluids without difficulty.  Pt aware of pregnancy and will follow up with her ob/gyn.  Pt stable for discharge        MEDICAL DECISION MAKING  Results were reviewed/discussed with the patient and they were also made aware of online access. Pt also made aware that some labs, such as cultures, will not be resulted during ER visit and follow up with PMD is necessary.     MDM  Number of Diagnoses or Management Options     Amount and/or Complexity of Data Reviewed  Clinical lab tests: ordered and reviewed  Decide to obtain previous medical records or to obtain history from someone other than the patient: yes (Epic)  Review and summarize past medical records: yes (Pt was last seen here on 8/21/19 for nausea and non-intractible vomiting )  Independent visualization of images, tracings, or specimens: yes    Patient Progress  Patient progress: stable         DIAGNOSIS  Final diagnoses:   Diarrhea, unspecified type       DISPOSITION  DISCHARGE    Patient discharged in stable condition.    Reviewed implications of results, diagnosis, meds, responsibility to follow up, warning signs and symptoms of possible worsening, potential complications and reasons to return to ER.    Patient/Family voiced understanding of above instructions.    Discussed plan for discharge, as there is no emergent indication for admission. Patient referred to primary care provider for BP management due to today's BP. Pt/family is agreeable and understands need for follow up and repeat testing.  Pt is aware that discharge does not mean that nothing is wrong but it indicates no emergency is  present that requires admission and they must continue care with follow-up as given below or physician of their choice.     FOLLOW-UP  [facility]ERR[facility]  [facility] [address] [phone]  Schedule an appointment as soon as possible for a visit            Medication List      No changes were made to your prescriptions during this visit.           Latest Documented Vital Signs:  As of 1:28 AM  BP- 108/58 HR- 75 Temp- 98.3 °F (36.8 °C) (Tympanic) O2 sat- 97%    --  Documentation assistance provided by vita Love for .  Information recorded by the scribe was done at my direction and has been verified and validated by me.       Shannon Love  09/13/19 0049       Dimas Maloney MD  09/13/19 0134

## 2019-09-23 ENCOUNTER — HOSPITAL ENCOUNTER (OUTPATIENT)
Dept: GENERAL RADIOLOGY | Facility: HOSPITAL | Age: 19
Discharge: HOME | End: 2019-09-23
Attending: OBSTETRICS & GYNECOLOGY

## 2019-10-22 ENCOUNTER — HOSPITAL ENCOUNTER (EMERGENCY)
Facility: HOSPITAL | Age: 19
Discharge: HOME | End: 2019-10-22
Attending: EMERGENCY MEDICINE | Admitting: EMERGENCY MEDICINE

## 2019-10-22 VITALS
WEIGHT: 130 LBS | SYSTOLIC BLOOD PRESSURE: 114 MMHG | RESPIRATION RATE: 18 BRPM | HEIGHT: 62 IN | OXYGEN SATURATION: 100 % | DIASTOLIC BLOOD PRESSURE: 67 MMHG | HEART RATE: 75 BPM | TEMPERATURE: 97.1 F | BODY MASS INDEX: 23.92 KG/M2

## 2019-10-22 DIAGNOSIS — J45.901 MODERATE ASTHMA WITH EXACERBATION, UNSPECIFIED WHETHER PERSISTENT: Primary | ICD-10-CM

## 2019-10-22 DIAGNOSIS — Z34.90 PREGNANCY, UNSPECIFIED GESTATIONAL AGE: ICD-10-CM

## 2019-10-22 PROCEDURE — 94640 AIRWAY INHALATION TREATMENT: CPT

## 2019-10-22 PROCEDURE — 99283 EMERGENCY DEPT VISIT LOW MDM: CPT

## 2019-10-22 PROCEDURE — 94799 UNLISTED PULMONARY SVC/PX: CPT

## 2019-10-22 RX ORDER — ALBUTEROL SULFATE 90 UG/1
2 AEROSOL, METERED RESPIRATORY (INHALATION) EVERY 4 HOURS PRN
Qty: 18 G | Refills: 0 | Status: SHIPPED | OUTPATIENT
Start: 2019-10-22 | End: 2022-01-21 | Stop reason: SDUPTHER

## 2019-10-22 RX ORDER — IPRATROPIUM BROMIDE AND ALBUTEROL SULFATE 2.5; .5 MG/3ML; MG/3ML
3 SOLUTION RESPIRATORY (INHALATION) ONCE
Status: COMPLETED | OUTPATIENT
Start: 2019-10-22 | End: 2019-10-22

## 2019-10-22 RX ORDER — ALBUTEROL SULFATE 2.5 MG/3ML
2.5 SOLUTION RESPIRATORY (INHALATION) ONCE
Status: COMPLETED | OUTPATIENT
Start: 2019-10-22 | End: 2019-10-22

## 2019-10-22 RX ADMIN — ALBUTEROL SULFATE 2.5 MG: 2.5 SOLUTION RESPIRATORY (INHALATION) at 09:12

## 2019-10-22 RX ADMIN — IPRATROPIUM BROMIDE AND ALBUTEROL SULFATE 3 ML: .5; 3 SOLUTION RESPIRATORY (INHALATION) at 09:25

## 2019-10-22 NOTE — ED PROVIDER NOTES
EMERGENCY DEPARTMENT ENCOUNTER    CHIEF COMPLAINT  Chief Complaint: shortness of breath  History given by: patient  History limited by: nothing  Room Number: 15/15  PMD: Mainor Larry PA      HPI:  Pt is a 19 y.o. female () who is currently three months pregnant. The patient who has a hx of asthma presents to the ED complaining of shortness of breath that began yesterday morning and worsened earlier today. The patient also complains of wheezing and a nonproductive cough since earlier this morning. The patient denies associated fever, chills, rhinorrhea, sore throat, abdominal pain, or vaginal bleeding. The patient states she used her rescue inhaler earlier this morning without improvement, so she came to the ER for further evaluation. There are no other complaints at this time.    Duration: one day  Onset: gradual  Timing: constant  Location: respiratory  Quality: shortness of breath  Intensity/Severity: moderate  Progression: worsened this morning  Associated Symptoms: wheezing and nonproductive cough  Aggravating Factors: none specified  Alleviating Factors: none specified  Previous Episodes: the patient has a hx of asthma.  Treatment before arrival: The patient states she used her rescue inhaler earlier this morning without improvement.     PAST MEDICAL HISTORY  Active Ambulatory Problems     Diagnosis Date Noted   • No Active Ambulatory Problems     Resolved Ambulatory Problems     Diagnosis Date Noted   • No Resolved Ambulatory Problems     Past Medical History:   Diagnosis Date   • Asthma    • Narcolepsy    • Scoliosis        PAST SURGICAL HISTORY  History reviewed. No pertinent surgical history.    FAMILY HISTORY  History reviewed. No pertinent family history.    SOCIAL HISTORY  Social History     Socioeconomic History   • Marital status: Single     Spouse name: Not on file   • Number of children: Not on file   • Years of education: Not on file   • Highest education level: Not on file   Tobacco Use   •  Smoking status: Current Some Day Smoker     Packs/day: 0.25   • Smokeless tobacco: Never Used   Substance and Sexual Activity   • Alcohol use: Yes     Comment: occationally    • Drug use: No   • Sexual activity: Yes     Birth control/protection: None       ALLERGIES  Patient has no known allergies.    REVIEW OF SYSTEMS  Review of Systems   Constitutional: Negative for chills and fever.   HENT: Negative for rhinorrhea and sore throat.    Eyes: Negative.    Respiratory: Positive for cough (nonproductive), shortness of breath and wheezing.    Cardiovascular: Negative for chest pain.   Gastrointestinal: Negative for abdominal pain, diarrhea and vomiting.   Genitourinary: Negative for dysuria and vaginal bleeding.   Musculoskeletal: Negative for neck pain.   Skin: Negative for rash.   Allergic/Immunologic: Negative.    Neurological: Negative for weakness, numbness and headaches.   Hematological: Negative.    Psychiatric/Behavioral: Negative.    All other systems reviewed and are negative.      PHYSICAL EXAM  ED Triage Vitals [10/22/19 0821]   Temp Heart Rate Resp BP SpO2   97.9 °F (36.6 °C) (!) 135 24 -- 98 %      Temp src Heart Rate Source Patient Position BP Location FiO2 (%)   Tympanic Monitor -- -- --       Physical Exam   Constitutional: She is oriented to person, place, and time. No distress.   HENT:   Head: Normocephalic and atraumatic.   Eyes: EOM are normal. Pupils are equal, round, and reactive to light.   Neck: Normal range of motion. Neck supple.   Cardiovascular: Normal rate, regular rhythm and normal heart sounds. Exam reveals no gallop and no friction rub.   No murmur heard.  Pulmonary/Chest: Effort normal. No respiratory distress. She has decreased breath sounds (bilaterally). She has wheezes (diffuse, bilaterally). She has no rhonchi. She has no rales. She exhibits no tenderness.   Abdominal: Soft. Bowel sounds are normal. She exhibits no distension and no mass. There is no tenderness. There is no  rebound and no guarding.   Musculoskeletal: Normal range of motion. She exhibits no edema.   Neurological: She is alert and oriented to person, place, and time. She has normal sensation and normal strength.   Skin: Skin is warm and dry. No rash noted.   Psychiatric: Mood and affect normal.   Nursing note and vitals reviewed.      PROCEDURES  Procedures      PROGRESS AND CONSULTS  ED Course as of Oct 22 1049   Tue Oct 22, 2019   0829 Old records reviewed.  Patient was seen here in the ER June 2019 for an asthma exacerbation.  Her symptoms improved with nebulizer treatments and Solu-Medrol.  Chest x-ray was negative at that time.  [WH]   1044 Patient is resting comfortably.  Oxygen saturation is 100% on room air.  On reexam, lungs are clear with good air movement.  Wheezing has resolved.  She states that she needs a prescription for a new inhaler.  She has a nebulizer machine at home but is missing the tubing.  I recommended that she either go to a medical supply [facility] or call her pulmonologist who ordered her nebulizer machine to see about obtaining new tubing.  [WH]      ED Course User Index  [WH] Rodolfo Montero MD     0833 Ordered Albuterol nebulizer and Duo-Neb to treat the patient's symptoms.      MEDICAL DECISION MAKING  Results were reviewed/discussed with the patient and they were also made aware of online access. Pt also made aware that some labs, such as cultures, will not be resulted during ER visit and follow up with PMD is necessary.     MDM  Number of Diagnoses or Management Options     Amount and/or Complexity of Data Reviewed  Decide to obtain previous medical records or to obtain history from someone other than the patient: yes  Review and summarize past medical records: yes (See progress notes for medical record review.)    Patient Progress  Patient progress: stable         DIAGNOSIS  Final diagnoses:   Moderate asthma with exacerbation, unspecified whether persistent   Pregnancy, unspecified  gestational age       DISPOSITION  DISCHARGE    Patient discharged in stable condition.    Reviewed implications of results, diagnosis, meds, responsibility to follow up, warning signs and symptoms of possible worsening, potential complications and reasons to return to ER, including any new or worsening symptoms.    Patient/Family voiced understanding of above instructions.    Discussed plan for discharge, as there is no emergent indication for admission. Patient referred to primary care provider for BP management due to today's BP. Pt/family is agreeable and understands need for follow up and repeat testing.  Pt is aware that discharge does not mean that nothing is wrong but it indicates no emergency is present that requires admission and they must continue care with follow-up as given below or physician of their choice.     FOLLOW-UP  Mainor Larry PA  [address] [facility] [address] [facility]  [address]  [place] [address]  [phone]    Schedule an appointment as soon as possible for a visit       your Obstetrician    Go to   As scheduled         Medication List      Stop    predniSONE 20 MG tablet  Commonly known as:  DELTASONE              Latest Documented Vital Signs:  As of 10:49 AM  BP- 129/80 HR- 85 Temp- 97.9 °F (36.6 °C) (Tympanic) O2 sat- 100%    --  Documentation assistance provided by vita Chino for Dr. Judah Montero MD.  Information recorded by the scribe was done at my direction and has been verified and validated by me.       Vonda Chino  10/22/19 6895       Rodolfo Montero MD  10/22/19 1394

## 2019-10-22 NOTE — DISCHARGE INSTRUCTIONS
Use your inhaler regularly.  Follow-up with your obstetrician as scheduled.  Return to emergency department for worsening symptoms, fever, or other concern.  Follow-up with your primary care doctor or your pulmonologist in the next several weeks.

## 2019-10-22 NOTE — ED TRIAGE NOTES
Pt has asthma and is wheezing.  Took rescue inhaler this am but is working hard to breath.  Pt is 3 months pregnant

## 2019-11-13 ENCOUNTER — OFFICE VISIT CONVERTED (OUTPATIENT)
Dept: FAMILY MEDICINE CLINIC | Facility: CLINIC | Age: 19
End: 2019-11-13
Attending: INTERNAL MEDICINE

## 2019-11-13 ENCOUNTER — CONVERSION ENCOUNTER (OUTPATIENT)
Dept: FAMILY MEDICINE CLINIC | Facility: CLINIC | Age: 19
End: 2019-11-13

## 2020-01-06 ENCOUNTER — HOSPITAL ENCOUNTER (EMERGENCY)
Facility: HOSPITAL | Age: 20
Discharge: HOME | End: 2020-01-06
Attending: EMERGENCY MEDICINE | Admitting: EMERGENCY MEDICINE

## 2020-01-06 VITALS
OXYGEN SATURATION: 98 % | WEIGHT: 140.2 LBS | HEIGHT: 62 IN | BODY MASS INDEX: 25.8 KG/M2 | TEMPERATURE: 98.1 F | RESPIRATION RATE: 16 BRPM | HEART RATE: 99 BPM | DIASTOLIC BLOOD PRESSURE: 68 MMHG | SYSTOLIC BLOOD PRESSURE: 114 MMHG

## 2020-01-06 DIAGNOSIS — J45.901 MODERATE ASTHMA WITH EXACERBATION, UNSPECIFIED WHETHER PERSISTENT: ICD-10-CM

## 2020-01-06 DIAGNOSIS — J06.9 UPPER RESPIRATORY TRACT INFECTION, UNSPECIFIED TYPE: Primary | ICD-10-CM

## 2020-01-06 DIAGNOSIS — Z34.90 PREGNANCY, UNSPECIFIED GESTATIONAL AGE: ICD-10-CM

## 2020-01-06 LAB
FLUAV AG NPH QL: NEGATIVE
FLUBV AG NPH QL IA: NEGATIVE

## 2020-01-06 PROCEDURE — 87804 INFLUENZA ASSAY W/OPTIC: CPT | Performed by: EMERGENCY MEDICINE

## 2020-01-06 PROCEDURE — 96365 THER/PROPH/DIAG IV INF INIT: CPT

## 2020-01-06 PROCEDURE — 94799 UNLISTED PULMONARY SVC/PX: CPT

## 2020-01-06 PROCEDURE — 25010000002 MAGNESIUM SULFATE 2 GM/50ML SOLUTION: Performed by: EMERGENCY MEDICINE

## 2020-01-06 PROCEDURE — 94640 AIRWAY INHALATION TREATMENT: CPT

## 2020-01-06 PROCEDURE — 25010000002 METHYLPREDNISOLONE PER 125 MG: Performed by: EMERGENCY MEDICINE

## 2020-01-06 PROCEDURE — 96375 TX/PRO/DX INJ NEW DRUG ADDON: CPT

## 2020-01-06 PROCEDURE — 99283 EMERGENCY DEPT VISIT LOW MDM: CPT

## 2020-01-06 RX ORDER — METHYLPREDNISOLONE SODIUM SUCCINATE 125 MG/2ML
125 INJECTION, POWDER, LYOPHILIZED, FOR SOLUTION INTRAMUSCULAR; INTRAVENOUS ONCE
Status: COMPLETED | OUTPATIENT
Start: 2020-01-06 | End: 2020-01-06

## 2020-01-06 RX ORDER — SODIUM CHLORIDE 0.9 % (FLUSH) 0.9 %
10 SYRINGE (ML) INJECTION AS NEEDED
Status: DISCONTINUED | OUTPATIENT
Start: 2020-01-06 | End: 2020-01-06 | Stop reason: HOSPADM

## 2020-01-06 RX ORDER — ALBUTEROL SULFATE 2.5 MG/3ML
2.5 SOLUTION RESPIRATORY (INHALATION)
Status: COMPLETED | OUTPATIENT
Start: 2020-01-06 | End: 2020-01-06

## 2020-01-06 RX ORDER — ALBUTEROL SULFATE 1.25 MG/3ML
1 SOLUTION RESPIRATORY (INHALATION) EVERY 6 HOURS PRN
Qty: 30 VIAL | Refills: 0 | Status: SHIPPED | OUTPATIENT
Start: 2020-01-06 | End: 2021-07-16

## 2020-01-06 RX ORDER — MAGNESIUM SULFATE HEPTAHYDRATE 40 MG/ML
2 INJECTION, SOLUTION INTRAVENOUS ONCE
Status: COMPLETED | OUTPATIENT
Start: 2020-01-06 | End: 2020-01-06

## 2020-01-06 RX ORDER — ALBUTEROL SULFATE 2.5 MG/3ML
2.5 SOLUTION RESPIRATORY (INHALATION) AS NEEDED
Status: DISCONTINUED | OUTPATIENT
Start: 2020-01-06 | End: 2020-01-06 | Stop reason: HOSPADM

## 2020-01-06 RX ADMIN — METHYLPREDNISOLONE SODIUM SUCCINATE 125 MG: 125 INJECTION, POWDER, FOR SOLUTION INTRAMUSCULAR; INTRAVENOUS at 02:14

## 2020-01-06 RX ADMIN — ALBUTEROL SULFATE 2.5 MG: 2.5 SOLUTION RESPIRATORY (INHALATION) at 02:19

## 2020-01-06 RX ADMIN — ALBUTEROL SULFATE 2.5 MG: 2.5 SOLUTION RESPIRATORY (INHALATION) at 02:21

## 2020-01-06 RX ADMIN — MAGNESIUM SULFATE 2 G: 2 INJECTION INTRAVENOUS at 02:14

## 2020-01-06 RX ADMIN — ALBUTEROL SULFATE 2.5 MG: 2.5 SOLUTION RESPIRATORY (INHALATION) at 02:20

## 2020-01-06 NOTE — ED PROVIDER NOTES
EMERGENCY DEPARTMENT ENCOUNTER    CHIEF COMPLAINT  Chief Complaint: SOA  History given by: patient  History limited by: nothing  Room Number: 09/09  PMD: Mainor Larry PA      HPI:  Pt is a 19 y.o. female with hx of Asthma presents complaining of constant SOA which began PTA. Admits to dry cough. She has used a DUO-NEB Tx with minimal relief.  Pt states she is 6 months pregnant.     Duration:  PTA  Onset: gradual  Timing: constant  Quality: SOA  Intensity/Severity: mild  Progression: unchanged  Associated Symptoms: dry cough  Aggravating/Alleviating Factors: none specified    PAST MEDICAL HISTORY  Active Ambulatory Problems     Diagnosis Date Noted   • No Active Ambulatory Problems     Resolved Ambulatory Problems     Diagnosis Date Noted   • No Resolved Ambulatory Problems     Past Medical History:   Diagnosis Date   • Asthma    • Narcolepsy    • Scoliosis        PAST SURGICAL HISTORY  No past surgical history on file.    FAMILY HISTORY  No family history on file.    SOCIAL HISTORY  Social History     Socioeconomic History   • Marital status: Single     Spouse name: Not on file   • Number of children: Not on file   • Years of education: Not on file   • Highest education level: Not on file   Tobacco Use   • Smoking status: Current Some Day Smoker     Packs/day: 0.25   • Smokeless tobacco: Never Used   Substance and Sexual Activity   • Alcohol use: Yes     Comment: occationally    • Drug use: No   • Sexual activity: Yes     Birth control/protection: None       ALLERGIES  Patient has no known allergies.    REVIEW OF SYSTEMS  Review of Systems   Constitutional: Negative for fever.   HENT: Negative for sore throat.    Eyes: Negative.    Respiratory: Positive for cough (dry) and shortness of breath.    Cardiovascular: Negative for chest pain.   Gastrointestinal: Negative for abdominal pain, diarrhea and vomiting.   Genitourinary: Negative for dysuria.   Musculoskeletal: Negative for neck pain.   Skin: Negative for  rash.   Allergic/Immunologic: Negative.    Neurological: Negative for weakness, numbness and headaches.   Hematological: Negative.    Psychiatric/Behavioral: Negative.    All other systems reviewed and are negative.      PHYSICAL EXAM  ED Triage Vitals [01/06/20 0021]   Temp Heart Rate Resp BP SpO2   98.1 °F (36.7 °C) 101 24 -- 99 %      Temp src Heart Rate Source Patient Position BP Location FiO2 (%)   Tympanic Monitor -- -- --       Physical Exam   Constitutional: She is oriented to person, place, and time. No distress.   HENT:   Head: Normocephalic and atraumatic.   Eyes: Pupils are equal, round, and reactive to light. EOM are normal.   Neck: Normal range of motion. Neck supple.   Cardiovascular: Normal rate, regular rhythm and normal heart sounds.   Pulmonary/Chest: Effort normal. No respiratory distress. She has no decreased breath sounds. She has wheezes (mild to moderate expiratory). She has no rhonchi. She has no rales.   Abdominal: Soft. She exhibits distension (consistent with pregnancy). There is no tenderness. There is no rebound and no guarding.   Musculoskeletal: Normal range of motion. She exhibits no edema.   Neurological: She is alert and oriented to person, place, and time. She has normal sensation and normal strength.   Skin: Skin is warm and dry. No rash noted.   Psychiatric: Mood and affect normal.   Nursing note and vitals reviewed.      LAB RESULTS  Lab Results (last 24 hours)     Procedure Component Value Units Date/Time    Influenza Antigen, Rapid - Swab, Nasopharynx [[id number]]  (Normal) Collected:  01/06/20 0154    Specimen:  Swab from Nasopharynx Updated:  01/06/20 0211     Influenza A Ag, EIA Negative     Influenza B Ag, EIA Negative          I ordered the above labs and reviewed the results    PROCEDURES  Procedures      PROGRESS AND CONSULTS     0240- Rechecked pt who is resting comfortably in NAD. Pt states that she is feeling better. Upon re-exam: the pt's breathing has improved.  Informed pt of the lab results. D/w pt the plan for DC with sx tx. Informed the pt that upon DC she will be given OBGYN f/u. Pt understands and agrees with plan. All questions answered.        MEDICATIONS GIVEN IN ER  Medications   magnesium sulfate 2g/50 mL (PREMIX) infusion (0 g Intravenous Stopped 1/6/20 0338)   albuterol (PROVENTIL) nebulizer solution 0.083% 2.5 mg/3mL (2.5 mg Nebulization Given 1/6/20 0221)   methylPREDNISolone sodium succinate (SOLU-Medrol) injection 125 mg (125 mg Intravenous Given 1/6/20 0214)           MEDICAL DECISION MAKING  Results were reviewed/discussed with the patient and they were also made aware of online access. Pt also made aware that some labs, such as cultures, will not be resulted during ER visit and follow up with PMD is necessary.     MDM  Number of Diagnoses or Management Options  Upper respiratory tract infection, unspecified type:      Amount and/or Complexity of Data Reviewed  Clinical lab tests: reviewed and ordered (Influenza: Negative)           DIAGNOSIS  Final diagnoses:   Upper respiratory tract infection, unspecified type   Moderate asthma with exacerbation, unspecified whether persistent   Pregnancy, 6 months       DISPOSITION  DISCHARGE    Patient discharged in stable condition.    Reviewed implications of results, diagnosis, meds, responsibility to follow up, warning signs and symptoms of possible worsening, potential complications and reasons to return to ER.    Patient/Family voiced understanding of above instructions.    Discussed plan for discharge, as there is no emergent indication for admission. Patient referred to primary care provider for BP management due to today's BP. Pt/family is agreeable and understands need for follow up and repeat testing.  Pt is aware that discharge does not mean that nothing is wrong but it indicates no emergency is present that requires admission and they must continue care with follow-up as given below or physician of their  choice.     FOLLOW-UP  Sara Christopher MD  [address]  [place] [address]  [phone]    Call            Medication List      Changed    * albuterol sulfate  (90 Base) MCG/ACT inhaler  Commonly known as:  PROVENTIL HFA;VENTOLIN HFA;PROAIR HFA  Inhale 2 puffs Every 4 (Four) Hours As Needed for Wheezing.  What changed:  Another medication with the same name was added. Make sure   you understand how and when to take each.     * albuterol 1.25 MG/3ML nebulizer solution  Commonly known as:  ACCUNEB  Take 3 mL by nebulization Every 6 (Six) Hours As Needed for Wheezing.  What changed:  You were already taking a medication with the same name,   and this prescription was added. Make sure you understand how and when to   take each.         * This list has 2 medication(s) that are the same as other medications   prescribed for you. Read the directions carefully, and ask your doctor or   other care provider to review them with you.                  Latest Documented Vital Signs:  As of 6:17 AM  BP- 114/68 HR- 99 Temp- 98.1 °F (36.7 °C) (Tympanic) O2 sat- 98%    --  Documentation assistance provided by vita Hart for Dr. Gonzalez.  Information recorded by the scribe was done at my direction and has been verified and validated by me.     Di Hart  01/06/20 0245       Christiano Gonzalez MD  01/06/20 9403

## 2020-01-06 NOTE — ED TRIAGE NOTES
"Pt to ED from home with reports of SOA tonight, hx asthma, and states \"I think I have the flu\".  Pt taking treatments at home, last treatment 2100.    Pt is six months pregnant, is denying cramps/vag bleeding, but is c/o generalized body aches.    Pt wheezing, unable to speak full sentences upon arrival to ED.  "

## 2020-01-07 ENCOUNTER — CONVERSION ENCOUNTER (OUTPATIENT)
Dept: FAMILY MEDICINE CLINIC | Facility: CLINIC | Age: 20
End: 2020-01-07

## 2020-01-07 ENCOUNTER — OFFICE VISIT CONVERTED (OUTPATIENT)
Dept: FAMILY MEDICINE CLINIC | Facility: CLINIC | Age: 20
End: 2020-01-07
Attending: PHYSICIAN ASSISTANT

## 2020-02-08 ENCOUNTER — HOSPITAL ENCOUNTER (OUTPATIENT)
Dept: LABOR AND DELIVERY | Facility: HOSPITAL | Age: 20
Discharge: HOME | End: 2020-02-08
Attending: OBSTETRICS & GYNECOLOGY

## 2020-02-08 LAB
APPEARANCE UR: ABNORMAL
BILIRUB UR QL: NEGATIVE
COLOR UR: YELLOW
CONV BACTERIA: ABNORMAL
CONV COLLECTION SOURCE (UA): ABNORMAL
CONV HYALINE CASTS IN URINE MICRO: ABNORMAL /[LPF]
CONV UROBILINOGEN IN URINE BY AUTOMATED TEST STRIP: 1 {EHRLICHU}/DL (ref 0.1–1)
FIBRINOGEN PPP-MCNC: 328 MG/DL (ref 200–450)
GLUCOSE UR QL: NEGATIVE MG/DL
HGB F MFR AMN: 0 % (ref 0–0.99)
HGB UR QL STRIP: NEGATIVE
KETONES UR QL STRIP: NEGATIVE MG/DL
LEUKOCYTE ESTERASE UR QL STRIP: ABNORMAL
NITRITE UR QL STRIP: NEGATIVE
PH UR STRIP.AUTO: 6 [PH] (ref 5–8)
PROT UR QL: NEGATIVE MG/DL
RBC #/AREA URNS HPF: ABNORMAL /[HPF]
SP GR UR: 1.02 (ref 1–1.03)
SQUAMOUS SPT QL MICRO: ABNORMAL /[HPF]
WBC #/AREA URNS HPF: ABNORMAL /[HPF]

## 2020-02-10 LAB — BACTERIA UR CULT: NORMAL

## 2020-04-13 ENCOUNTER — TELEPHONE CONVERTED (OUTPATIENT)
Dept: FAMILY MEDICINE CLINIC | Facility: CLINIC | Age: 20
End: 2020-04-13
Attending: PHYSICIAN ASSISTANT

## 2020-04-19 ENCOUNTER — HOSPITAL ENCOUNTER (EMERGENCY)
Facility: HOSPITAL | Age: 20
Discharge: HOME | End: 2020-04-19
Attending: OBSTETRICS & GYNECOLOGY | Admitting: OBSTETRICS & GYNECOLOGY

## 2020-04-19 VITALS
HEIGHT: 62 IN | HEART RATE: 85 BPM | RESPIRATION RATE: 16 BRPM | TEMPERATURE: 98.3 F | DIASTOLIC BLOOD PRESSURE: 68 MMHG | BODY MASS INDEX: 29.63 KG/M2 | WEIGHT: 161 LBS | SYSTOLIC BLOOD PRESSURE: 118 MMHG

## 2020-04-19 PROBLEM — O99.513 ASTHMA AFFECTING PREGNANCY IN THIRD TRIMESTER: Status: ACTIVE | Noted: 2020-04-19

## 2020-04-19 PROBLEM — O09.30 INSUFFICIENT PRENATAL CARE: Status: ACTIVE | Noted: 2020-04-19

## 2020-04-19 PROBLEM — J45.909 ASTHMA AFFECTING PREGNANCY IN THIRD TRIMESTER: Status: ACTIVE | Noted: 2020-04-19

## 2020-04-19 LAB
BACTERIA UR QL AUTO: ABNORMAL /HPF
BILIRUB UR QL STRIP: NEGATIVE
CLARITY UR: CLEAR
COLOR UR: YELLOW
GLUCOSE UR STRIP-MCNC: NEGATIVE MG/DL
HGB UR QL STRIP.AUTO: NEGATIVE
HYALINE CASTS UR QL AUTO: ABNORMAL /LPF
KETONES UR QL STRIP: NEGATIVE
LEUKOCYTE ESTERASE UR QL STRIP.AUTO: ABNORMAL
NITRITE UR QL STRIP: NEGATIVE
PH UR STRIP.AUTO: 5.5 [PH] (ref 5–8)
PROT UR QL STRIP: NEGATIVE
RBC # UR: ABNORMAL /HPF
REF LAB TEST METHOD: ABNORMAL
SP GR UR STRIP: 1.02 (ref 1–1.03)
SQUAMOUS #/AREA URNS HPF: ABNORMAL /HPF
UROBILINOGEN UR QL STRIP: ABNORMAL
WBC UR QL AUTO: ABNORMAL /HPF

## 2020-04-19 PROCEDURE — 81001 URINALYSIS AUTO W/SCOPE: CPT | Performed by: OBSTETRICS & GYNECOLOGY

## 2020-04-19 PROCEDURE — 87086 URINE CULTURE/COLONY COUNT: CPT | Performed by: OBSTETRICS & GYNECOLOGY

## 2020-04-19 PROCEDURE — 59025 FETAL NON-STRESS TEST: CPT

## 2020-04-19 PROCEDURE — 99283 EMERGENCY DEPT VISIT LOW MDM: CPT

## 2020-04-19 RX ORDER — ACETAMINOPHEN 500 MG
500 TABLET ORAL EVERY 6 HOURS PRN
COMMUNITY
End: 2021-07-16

## 2020-04-19 RX ORDER — FERROUS SULFATE 325(65) MG
325 TABLET ORAL
COMMUNITY
End: 2021-07-16

## 2020-04-19 RX ORDER — PANTOPRAZOLE SODIUM 20 MG/1
20 TABLET, DELAYED RELEASE ORAL DAILY
COMMUNITY
End: 2021-06-09

## 2020-04-19 NOTE — NURSING NOTE
Pt's cervix is unchanged so she will go home. Dr Woodall requests prenatal records from [facility] in case pt returns to [facility] to deliver. This was done. Pt discharged home.

## 2020-04-19 NOTE — ED PROVIDER NOTES
[facility]  Melina Styles  : 2000  MRN: [id number]  CSN: [id number]    OB ED Provider Note    Subjective   Chief Complaint   Patient presents with   • Leg Swelling     38 weeks, uncomplicated pregnancy, baby for adoption, , dizzy and nauseated tonight     Melina Styles is a 20 y.o. year old  with an Estimated Date of Delivery: 20 currently at 38w2d presenting with dizziness and nausea following a hot shower tonight.  She took the shower to help relieve BLE edema.  She denies HA, visual change, epigastric pain.  No CTX, ROM or VB.  FM is present.  This baby is up for adoption.    Prenatal care has been with Dr. Fox in [place]..  It has been benign.    OB History    Para Term  AB Living   3 2 2 0 0 2   SAB TAB Ectopic Molar Multiple Live Births   0 0 0 0 0 2      # Outcome Date GA Lbr Fabrice/2nd Weight Sex Delivery Anes PTL Lv   3 Current            2 Term 10/21/16 38w0d  2551 g (5 lb 10 oz) F Vag-Spont EPI  KARISSA      Name: Eleno   1 Term 11/10/15 38w0d  2778 g (6 lb 2 oz) M Vag-Spont EPI N KARISSA      Complications: Asthma      Name: Bogdan     Past Medical History:   Diagnosis Date   • Asthma    • Narcolepsy    • Scoliosis      No past surgical history on file.  No current facility-administered medications for this encounter.     No Known Allergies  Social History    Tobacco Use      Smoking status: Current Some Day Smoker        Packs/day: 0.25      Smokeless tobacco: Never Used    Review of Systems   Constitutional: Negative.    HENT: Negative.    Eyes: Negative.    Respiratory: Negative.    Cardiovascular: Positive for leg swelling.   Gastrointestinal: Positive for nausea.   Genitourinary: Negative.    Musculoskeletal: Negative.    Skin: Negative.    Allergic/Immunologic: Negative.    Neurological: Positive for dizziness.   Hematological: Negative.          Objective   /79 (BP Location: Right arm, Patient Position: Lying)   Pulse 101   Temp 98.3 °F (36.8  "°C) (Oral)   Resp 16   Ht 157.5 cm (62\")   Wt 73 kg (161 lb)   LMP 07/26/2019 (Exact Date)   BMI 29.45 kg/m²   General: well developed; well nourished  no acute distress   Abdomen: no umbilical or inguinal hernias are present  gravid, size appropriate for dates   FHT's: category 1      Cervix: was checked (by me): 3 cm / 50 % / -3 unchanged on recheck   Presentation: cephalic   Contractions: q 2-6 min   Chest: Unlabored respirations, good air movement   CV:  Mild tachycardia, RR   Back: CVA tenderness is not evaluated n/a                                  Ext:  Trace bilateral pedal edema  Prenatal Labs  Lab Results   Component Value Date    HGB 11.5 (L) 09/13/2019    ABORH A Rh Positive 11/09/2015       Current Labs Reviewed   UA:    Lab Results   Component Value Date    SQUAMEPIUA 3-6 (A) 04/19/2020    SPECGRAVUR 1.018 04/19/2020    KETONESU Negative 04/19/2020    BLOODU Negative 04/19/2020    LEUKOCYTESUR Small (1+) (A) 04/19/2020    NITRITEU Negative 04/19/2020    RBCUA 0-2 04/19/2020    WBCUA 6-12 (A) 04/19/2020    BACTERIA None Seen 04/19/2020          Assessment   1. IUP at 38w2d  2. Dizziness- likely secondary to vagal response after hot shower.  Now resolved  3. Uterine CTX-     Plan   1. If no cervical change after 1 hour, D/C home.  She is scheduled for induction on 4/27 with Dr. Fox.  I encouraged her to either change to a [employer] physician if she wishes to deliver here, or go to the hospital in [place] for future problems.      Autumn Woodall MD  4/19/2020  03:37     "

## 2020-04-20 LAB — BACTERIA SPEC AEROBE CULT: NO GROWTH

## 2020-04-21 ENCOUNTER — TELEMEDICINE CONVERTED (OUTPATIENT)
Dept: FAMILY MEDICINE CLINIC | Facility: CLINIC | Age: 20
End: 2020-04-21
Attending: PHYSICIAN ASSISTANT

## 2020-04-23 ENCOUNTER — TELEMEDICINE CONVERTED (OUTPATIENT)
Dept: FAMILY MEDICINE CLINIC | Facility: CLINIC | Age: 20
End: 2020-04-23
Attending: PHYSICIAN ASSISTANT

## 2020-05-12 ENCOUNTER — CONVERSION ENCOUNTER (OUTPATIENT)
Dept: FAMILY MEDICINE CLINIC | Facility: CLINIC | Age: 20
End: 2020-05-12

## 2020-05-12 ENCOUNTER — OFFICE VISIT CONVERTED (OUTPATIENT)
Dept: FAMILY MEDICINE CLINIC | Facility: CLINIC | Age: 20
End: 2020-05-12
Attending: PHYSICIAN ASSISTANT

## 2020-06-04 ENCOUNTER — OFFICE VISIT CONVERTED (OUTPATIENT)
Dept: FAMILY MEDICINE CLINIC | Facility: CLINIC | Age: 20
End: 2020-06-04
Attending: PHYSICIAN ASSISTANT

## 2020-06-26 PROCEDURE — 99283 EMERGENCY DEPT VISIT LOW MDM: CPT

## 2020-06-27 ENCOUNTER — HOSPITAL ENCOUNTER (EMERGENCY)
Facility: HOSPITAL | Age: 20
Discharge: HOME | End: 2020-06-27
Attending: EMERGENCY MEDICINE | Admitting: EMERGENCY MEDICINE

## 2020-06-27 VITALS
TEMPERATURE: 97.6 F | HEART RATE: 90 BPM | BODY MASS INDEX: 28.52 KG/M2 | HEIGHT: 63 IN | OXYGEN SATURATION: 100 % | SYSTOLIC BLOOD PRESSURE: 126 MMHG | DIASTOLIC BLOOD PRESSURE: 80 MMHG | RESPIRATION RATE: 18 BRPM

## 2020-06-27 DIAGNOSIS — T43.625A ADVERSE EFFECT OF AMPHETAMINE, INITIAL ENCOUNTER: Primary | ICD-10-CM

## 2020-06-27 PROCEDURE — 63710000001 ONDANSETRON ODT 4 MG TABLET DISPERSIBLE: Performed by: EMERGENCY MEDICINE

## 2020-06-27 RX ORDER — ONDANSETRON 4 MG/1
4 TABLET, ORALLY DISINTEGRATING ORAL ONCE
Status: COMPLETED | OUTPATIENT
Start: 2020-06-27 | End: 2020-06-27

## 2020-06-27 RX ORDER — ONDANSETRON 4 MG/1
4 TABLET, ORALLY DISINTEGRATING ORAL 4 TIMES DAILY PRN
Qty: 10 TABLET | Refills: 0 | Status: SHIPPED | OUTPATIENT
Start: 2020-06-27 | End: 2021-07-16

## 2020-06-27 RX ADMIN — ONDANSETRON 4 MG: 4 TABLET, ORALLY DISINTEGRATING ORAL at 00:14

## 2020-07-21 ENCOUNTER — HOSPITAL ENCOUNTER (OUTPATIENT)
Dept: LAB | Facility: HOSPITAL | Age: 20
Discharge: HOME | End: 2020-07-21
Attending: NURSE PRACTITIONER

## 2020-07-21 LAB — LITHIUM SERPL-SCNC: 0.1 MEQ/L (ref 0.5–1.5)

## 2020-07-28 ENCOUNTER — HOSPITAL ENCOUNTER (OUTPATIENT)
Dept: FAMILY MEDICINE CLINIC | Facility: CLINIC | Age: 20
Discharge: HOME | End: 2020-07-28
Attending: PHYSICIAN ASSISTANT

## 2020-07-28 ENCOUNTER — OFFICE VISIT CONVERTED (OUTPATIENT)
Dept: FAMILY MEDICINE CLINIC | Facility: CLINIC | Age: 20
End: 2020-07-28
Attending: PHYSICIAN ASSISTANT

## 2020-07-28 ENCOUNTER — HOSPITAL ENCOUNTER (OUTPATIENT)
Dept: URGENT CARE | Facility: CLINIC | Age: 20
Discharge: HOME | End: 2020-07-28
Attending: PHYSICIAN ASSISTANT

## 2020-07-28 ENCOUNTER — CONVERSION ENCOUNTER (OUTPATIENT)
Dept: FAMILY MEDICINE CLINIC | Facility: CLINIC | Age: 20
End: 2020-07-28

## 2020-07-30 LAB
BACTERIA SPEC AEROBE CULT: NORMAL
SARS-COV-2 RNA SPEC QL NAA+PROBE: NOT DETECTED

## 2020-08-17 ENCOUNTER — OFFICE VISIT CONVERTED (OUTPATIENT)
Dept: FAMILY MEDICINE CLINIC | Facility: CLINIC | Age: 20
End: 2020-08-17
Attending: PHYSICIAN ASSISTANT

## 2020-09-21 ENCOUNTER — TELEMEDICINE CONVERTED (OUTPATIENT)
Dept: FAMILY MEDICINE CLINIC | Facility: CLINIC | Age: 20
End: 2020-09-21
Attending: PHYSICIAN ASSISTANT

## 2020-11-05 ENCOUNTER — TELEMEDICINE CONVERTED (OUTPATIENT)
Dept: FAMILY MEDICINE CLINIC | Facility: CLINIC | Age: 20
End: 2020-11-05
Attending: PHYSICIAN ASSISTANT

## 2020-11-19 ENCOUNTER — TELEMEDICINE CONVERTED (OUTPATIENT)
Dept: FAMILY MEDICINE CLINIC | Facility: CLINIC | Age: 20
End: 2020-11-19
Attending: PHYSICIAN ASSISTANT

## 2020-12-03 ENCOUNTER — TELEMEDICINE CONVERTED (OUTPATIENT)
Dept: FAMILY MEDICINE CLINIC | Facility: CLINIC | Age: 20
End: 2020-12-03
Attending: PHYSICIAN ASSISTANT

## 2021-01-04 ENCOUNTER — TELEMEDICINE CONVERTED (OUTPATIENT)
Dept: FAMILY MEDICINE CLINIC | Facility: CLINIC | Age: 21
End: 2021-01-04
Attending: PHYSICIAN ASSISTANT

## 2021-01-08 ENCOUNTER — TELEMEDICINE CONVERTED (OUTPATIENT)
Dept: FAMILY MEDICINE CLINIC | Facility: CLINIC | Age: 21
End: 2021-01-08
Attending: PHYSICIAN ASSISTANT

## 2021-03-04 ENCOUNTER — HOSPITAL ENCOUNTER (OUTPATIENT)
Dept: LAB | Facility: HOSPITAL | Age: 21
Discharge: HOME | End: 2021-03-04
Attending: PHYSICIAN ASSISTANT

## 2021-03-05 LAB
CONV MUMPS ANTIBODY IGG: 17.3 AU/ML
MEV IGG SER IA-ACNC: 164 AU/ML
RUBV IGG SER-ACNC: 144.6 [IU]/ML
VZV IGG SER IA-ACNC: 1987 INDEX

## 2021-03-09 LAB
CONV QUANTIFERON TB GOLD: POSITIVE
QUANTIFERON CRITERIA: ABNORMAL
QUANTIFERON MITOGEN VALUE: >10 IU/ML
QUANTIFERON NIL VALUE: 0.05 IU/ML
QUANTIFERON TB1 AG VALUE: 0.58 IU/ML
QUANTIFERON TB2 AG VALUE: 0.65 IU/ML

## 2021-03-10 ENCOUNTER — HOSPITAL ENCOUNTER (OUTPATIENT)
Dept: GENERAL RADIOLOGY | Facility: HOSPITAL | Age: 21
Discharge: HOME | End: 2021-03-10
Attending: PHYSICIAN ASSISTANT

## 2021-03-22 ENCOUNTER — TELEMEDICINE CONVERTED (OUTPATIENT)
Dept: FAMILY MEDICINE CLINIC | Facility: CLINIC | Age: 21
End: 2021-03-22
Attending: PHYSICIAN ASSISTANT

## 2021-05-12 NOTE — PROGRESS NOTES
Progress Note      Patient Name: Melina Styles   Patient ID: [id number]   Sex: Female   YOB: 2000    Primary Care Provider: Mainor Larry PA-C   Referring Provider: Mainor Larry PA-C    Visit Date: April 23, 2020    Provider: Mainor Larry PA-C   [facility]   Location Address: [address] [place] [address], [address]  [place], [place]  [id number]   Location Phone: [phone]          Chief Complaint  · headache on rt side/tooth ache      History Of Present Illness  Video Conferencing Visit  Melina Styles is a 20 year old /White female who is presenting for evaluation via video conferencing. Verbal consent obtained before beginning visit.   The following staff were present during this visit: Eli Jones CMA/Mainor Larry PA-C   Melina Styles is a 20 year old /White female who presents for evaluation and treatment of: headache on right side/tooth ache.      pt presents today for headache on right side which she believes is due to a tooth ache.  pt states the pain radiates down into the jaw.    Right inferior molar pain, chipped and broken, she needed a root canal but cant afford it.  She is breast feeding, no fever, chills.    Pt delivered her baby girl 3 days ago.  She gave the baby up for adoption.             Past Medical History  Disease Name Date Onset Notes   Asthma --  --    Nicotine dependence 10/31/2018 --    Pap smear for cervical cancer screening 9/2019 NORMAL EPW         Past Surgical History  Procedure Name Date Notes   *Denies any surgical procedures --  --          Medication List  Name Date Started Instructions   Compact Compressor Nebulizer miscellaneous misc 01/07/2020 use as directed   ibuprofen 800 mg oral tablet  take 1 tablet (800 mg) by oral route 3 times per day with food   ipratropium-albuterol 0.5 mg-3 mg(2.5 mg base)/3 mL inhalation solution for nebulization 01/07/2020 inhale 3 milliliters by nebulization route 4 times per day and as needed, up to 6  "doses per day for 30 days   triamcinolone acetonide 0.1 % topical ointment 2020 apply a thin layer to the affected area(s) by topical route 2 times per day   Ventolin HFA 90 mcg/actuation inhalation HFA aerosol inhaler 2020 inhale 1 - 2 puffs (90 - 180 mcg) by inhalation route every 4-6 hours as needed         Allergy List  Allergen Name Date Reaction Notes   NO KNOWN DRUG ALLERGIES --  --  --        Allergies Reconciled  Family Medical History  Disease Name Relative/Age Notes   Colon Cancer Grandfather (maternal)/   --          Reproductive History  Menstrual   Age Menarche: 10   Pregnancy Summary   Total Pregnancies: 2 Full Term: 2 Premature: 0   Ab Induced: 0 Ab Spontaneous: 0 Ectopics: 0   Multiples: 0 Livin         Social History  Finding Status Start/Stop Quantity Notes   Active but no formal exercise --  --/-- --  --    Alcohol Light --/-- --  2019 -    No known infection risk --  --/-- --  --     --  --/-- --  --    Tobacco Never --/-- --  --    Tobacco use --  --/-- --  smokes socially \"not every day\"         Review of Systems  · Constitutional  o Denies  o : fever, fatigue, weight loss, weight gain  · Cardiovascular  o Denies  o : lower extremity edema, claudication, chest pressure, palpitations  · Respiratory  o Denies  o : shortness of breath, wheezing, cough, hemoptysis, dyspnea on exertion  · Gastrointestinal  o Denies  o : nausea, vomiting, diarrhea, constipation, abdominal pain      Physical Examination  · Constitutional  o Appearance  o : well-nourished, no acute distress  · Respiratory  o Respiratory Effort  o : breathing comfortably, no cough  · Skin and Subcutaneous Tissue  o General Inspection  o : no visible rash, no lesions  · Neurologic  o Mental Status Examination  o :   § Orientation  § : grossly oriented to person, place and time, no facial droop          Assessment  · Toothache     525.9/K08.89  · Tooth abscess     522.5/K04.7      Plan  · Orders  o ACO-39: " Current medications updated and reviewed () - - 04/23/2020  · Medications  o penicillin V potassium 500 mg oral tablet   SIG: take 1 tablet (500 mg) by oral route 4 times per day for 10 days   DISP: (40) tablets with 0 refills  Prescribed on 04/23/2020     o Tessalon Perles 100 mg oral capsule   SIG: take 1 capsule (100 mg) by oral route 3 times per day PRN   DISP: (15) capsules with 0 refills  Prescribed on 04/23/2020     o Medications have been Reconciled  o Transition of Care or Provider Policy  · Instructions  o Take all medications as prescribed/directed.  o Patient was educated/instructed on their diagnosis, treatment and medications prior to discharge from the clinic today.  o Discussed Covid-19 precautions including, but not limited to, social distancing, avoid touching your face, and hand washing.   o Pt was referred to dental clinic at [facility]  · Disposition  o Call or Return if symptoms worsen or persist.  o Care Transition            Electronically Signed by: Mainor Larry PA-C -Author on April 23, 2020 12:58:11 PM

## 2021-05-12 NOTE — PROGRESS NOTES
"   Progress Note      Patient Name: Melina Styles   Patient ID: [id number]   Sex: Female   YOB: 2000    Primary Care Provider: Mainor Larry PA-C   Referring Provider: Mainor Larry PA-C    Visit Date: April 13, 2020    Provider: Mainor Larry PA-C   Location: [facility]   Location Address: [address] [place] [address], [address]  [place], [place]  [id number]   Location Phone: [phone]          Chief Complaint  · Telehealth - Face to Face Video Visit      History Of Present Illness  Video Conferencing Visit  Melina Styles is a 20 year old /White female who is presenting for evaluation via video conferencing (0xdata). Verbal consent obtained before beginning visit.   The following staff were present during this visit: Leatha Pat and Mainor Larry PA-C   Melina Styles is a 20 year old /White female who presents for evaluation and treatment of: headaches.      Pt c/o intermittent headaches (right sided) and nausea x 2 wks.  Pt states headaches happens twice daily once during the day and once during the night that wakes her up - episodes lasting 1-2HRS.    Pt describes her headaches stating \"it feels like my head is going to explode\"    Denies any vomiting, vision changes, lightheadedness, or dizziness.    No hx migraines.    No hx head injuries.    Has been taking OTC Tylenol 3 PO Q4HRS w/minimal relief.    Pt is currently 32 weeks pregnant with her third child.  She has never been dx with migraines.  She has been taking Tylenol 2-3 tabs every 4-6 hours.  I explained that she is probably experiencing rebound headaches.  She denies any edema or elevated BP, she just saw her OB.    If her HA cont she is to go to ER.  we discussed that it is rare to have HAs during preg.       Past Medical History  Disease Name Date Onset Notes   Asthma --  --    Nicotine dependence 10/31/2018 --    Pap smear for cervical cancer screening 9/2019 NORMAL EPW         Past Surgical History  Procedure Name " "Date Notes   *Denies any surgical procedures --  --          Medication List  Name Date Started Instructions   Compact Compressor Nebulizer miscellaneous misc 2020 use as directed   ipratropium-albuterol 0.5 mg-3 mg(2.5 mg base)/3 mL inhalation solution for nebulization 2020 inhale 3 milliliters by nebulization route 4 times per day and as needed, up to 6 doses per day for 30 days   Prenatal Vitamin 27 mg iron- 800 mcg oral tablet  --    Ventolin HFA 90 mcg/actuation inhalation HFA aerosol inhaler 2020 inhale 1 - 2 puffs (90 - 180 mcg) by inhalation route every 4-6 hours as needed         Allergy List  Allergen Name Date Reaction Notes   NO KNOWN DRUG ALLERGIES --  --  --        Allergies Reconciled  Family Medical History  Disease Name Relative/Age Notes   Colon Cancer Grandfather (maternal)/   --          Reproductive History  Menstrual   Age Menarche: 10   Pregnancy Summary   Total Pregnancies: 2 Full Term: 2 Premature: 0   Ab Induced: 0 Ab Spontaneous: 0 Ectopics: 0   Multiples: 0 Livin         Social History  Finding Status Start/Stop Quantity Notes   Active but no formal exercise --  --/-- --  --    Alcohol Light --/-- --  2019 -    No known infection risk --  --/-- --  --     --  --/-- --  --    Tobacco Never --/-- --  --    Tobacco use --  --/-- --  smokes socially \"not every day\"         Review of Systems  · Constitutional  o Denies  o : fever, fatigue, weight loss, weight gain  · Cardiovascular  o Denies  o : pedal edema, claudication, chest pressure, palpitations  · Respiratory  o Denies  o : shortness of breath, wheezing, cough, hemoptysis, dyspnea on exertion  · Gastrointestinal  o Denies  o : nausea, vomiting, diarrhea, constipation, abdominal pain      Physical Examination  · Constitutional  o Appearance  o : well-nourished, no acute distress  · Head and Face  o Head  o :   § Inspection  § : atraumatic, normocephalic  · Respiratory  o Respiratory Effort  o : " breathing comfortably, no cough  · Skin and Subcutaneous Tissue  o General Inspection  o : no visible rash, no lesions  · Neurologic  o Mental Status Examination  o :   § Orientation  § : grossly oriented to person, place and time, no facial droop              Assessment  · Headache     784.0/R51  · Nausea     787.02/R11.0  · Pregnancy     V22.2/Z34.90    Problems Reconciled  Plan  · Orders  o ACO-39: Current medications updated and reviewed () - - 04/13/2020  · Medications  o Medications have been Reconciled  o Transition of Care or Provider Policy  · Instructions  o Take all medications as prescribed/directed.  o Patient was educated/instructed on their diagnosis, treatment and medications prior to discharge from the clinic today.  o Discussed Covid-19 precautions including, but not limited to, social distancing, avoid touching your face, and hand washing.   o Hold all Tylenol, rest, increase fluids, monitor BP and look for edema  · Disposition  o Call or Return if symptoms worsen or persist.  o F/U in 1 week.  o Care Transition            Electronically Signed by: Mainor Larry PA-C -Author on April 19, 2020 08:11:38 PM

## 2021-05-13 NOTE — PROGRESS NOTES
Progress Note      Patient Name: Melina Styles   Patient ID: [id number]   Sex: Female   YOB: 2000    Primary Care Provider: Mainor Larry PA-C   Referring Provider: Mainor Larry PA-C    Visit Date: August 17, 2020    Provider: Mainor Larry PA-C   Location: [facility]   Location Address: [address] [place] [address], [address]  [place], [place]  [id number]   Location Phone: [phone]          Chief Complaint  · Asthma symptoms      History Of Present Illness  Melina Styles is a 20 year old /White female who presents for evaluation and treatment of:      increase in Asthma symptoms    Pt states that her asthma is becoming harder to manage.  She c/o SOA, chest pain, chest tightness and headache.    She is prescribed Ipratropium/Albuterol Neb treatment and Ventolin HFA.  Using her rescue inhaler every 2-3 hours.      She is not using a maintenance inhaler at this point.  States couldn't find one that her insurance would cover.  She has used her mothers Advair and doesn't like it.      Was seeing an Allergist at [facility], but pt stopped going.     Recent appt, tested for Strep and Covid.  Both negative.  She was given Rx of Amoxicillin, but did not take       Past Medical History  Disease Name Date Onset Notes   Asthma --  --    Nicotine dependence 10/31/2018 --    Pap smear for cervical cancer screening 9/2019 NORMAL EPW         Past Surgical History  Procedure Name Date Notes   *Denies any surgical procedures --  --          Medication List  Name Date Started Instructions   amoxicillin 500 mg oral capsule 07/28/2020 take 1 capsule (500 mg) by oral route 3 times per day   Compact Compressor Nebulizer miscellaneous misc 01/07/2020 use as directed   ipratropium-albuterol 0.5 mg-3 mg(2.5 mg base)/3 mL inhalation solution for nebulization 01/07/2020 inhale 3 milliliters by nebulization route 4 times per day and as needed, up to 6 doses per day for 30 days   Medrol ([place]) 4 mg oral tablets,dose pack  "2020 take by oral route as directed per package instructions   Symbicort 80-4.5 mcg/actuation inhalation HFA aerosol inhaler 2020 inhale 2 puffs by inhalation route 2 times per day in the morning and evening   Ventolin HFA 90 mcg/actuation inhalation HFA aerosol inhaler 2020 inhale 1 - 2 puffs (90 - 180 mcg) by inhalation route every 4-6 hours as needed for 30 days         Allergy List  Allergen Name Date Reaction Notes   NO KNOWN DRUG ALLERGIES --  --  --          Family Medical History  Disease Name Relative/Age Notes   Colon Cancer Grandfather (maternal)/   --          Reproductive History  Menstrual   Age Menarche: 10   Pregnancy Summary   Total Pregnancies: 2 Full Term: 2 Premature: 0   Ab Induced: 0 Ab Spontaneous: 0 Ectopics: 0   Multiples: 0 Livin         Social History  Finding Status Start/Stop Quantity Notes   Active but no formal exercise --  --/-- --  --    Alcohol Light --/-- --  2019 -    No known infection risk --  --/-- --  --     --  --/-- --  --    Tobacco Never --/-- --  --    Tobacco use --  --/-- --  smokes socially \"not every day\"         Review of Systems  · Constitutional  o Denies  o : fever, fatigue, weight loss, weight gain  · Cardiovascular  o Denies  o : lower extremity edema, claudication, chest pressure, palpitations  · Respiratory  o Admits  o : wheezing, cough  o Denies  o : shortness of breath, hemoptysis, dyspnea on exertion  · Gastrointestinal  o Denies  o : nausea, vomiting, diarrhea, constipation, abdominal pain      Vitals  Date Time BP Position Site L\R Cuff Size HR RR TEMP (F) WT  HT  BMI kg/m2 BSA m2 O2 Sat HC       2020 02:35 /68 Sitting    86 - R   125lbs 0oz 5'  2\" 22.86 1.57 98 %          Physical Examination  · Constitutional  o Appearance  o : well developed, well-nourished, no acute distress  · Head and Face  o Head  o : normocephalic, atraumatic  · Ears, Nose, Mouth and Throat  o Ears  o :   § External Ears  § : " external auditory canal appearance normal, no discharge present  § Otoscopic Examination  § : tympanic membranes pearly white/gray bilaterally  o Nose  o :   § External Nose  § : no lesions noted  § Nasopharynx  § : serous discharge present   o Oral Cavity  o :   § Oral Mucosa  § : oral mucosa light pink  o Throat  o :   § Oropharynx  § : tonsils without exudate, no palatal petechiae  · Neck  o Inspection/Palpation  o : normal appearance, no masses or tenderness, trachea midline  o Thyroid  o : gland size normal, nontender, no nodules or masses present on palpation  · Respiratory  o Respiratory Effort  o : breathing unlabored  o Inspection of Chest  o : chest rise symmetric bilaterally  o Auscultation of Lungs  o : wheezing present   · Cardiovascular  o Heart  o :   § Auscultation of Heart  § : regular rate and rhythm, no murmurs, gallops or rubs  o Peripheral Vascular System  o :   § Extremities  § : no edema  · Lymphatic  o Neck  o : no cervical lymphadenopathy, no supraclavicular lymphadenopathy  · Psychiatric  o Mood and Affect  o : mood normal, affect appropriate          Assessment  · Asthma     493.90/J45.909  · Cough     786.2/R05  · Shortness of breath     786.05/R06.02  · Chest tightness     786.59/R07.89      Plan  · Orders  o ACO-39: Current medications updated and reviewed () - - 08/17/2020  · Medications  o Medications have been Reconciled  o Transition of Care or Provider Policy  · Instructions  o Take all medications as prescribed/directed.  o Rest. Increase Fluids.  o Patient was educated/instructed on their diagnosis, treatment and medications prior to discharge from the clinic today.  o Discussed Covid-19 precautions including, but not limited to, social distancing, avoid touching your face, and hand washing.   · Disposition  o Call or Return if symptoms worsen or persist.  o F/U in clinic in 1 month.  o F/U 2 weeks  o Care Transition            Electronically Signed by: Mainor Larry PA-C  -Author on August 17, 2020 06:54:12 PM

## 2021-05-13 NOTE — PROGRESS NOTES
Progress Note      Patient Name: Melina Styles   Patient ID: [id number]   Sex: Female   YOB: 2000    Primary Care Provider: Mainro Larry PA-C   Referring Provider: Mainor Larry PA-C    Visit Date: July 28, 2020    Provider: Mainor Larry PA-C   [facility]   Location Address: [address] [place] [address], [address]  [place], [place]  [id number]   Location Phone: [phone]          Chief Complaint  · possible strep  · sore throat  · congestion  · fatigue      History Of Present Illness  Melina Styles is a 20 year old /White female who presents for evaluation and treatment of: possible strep.      pt presents today for possible strep throat.    C/O sore throat, fatigue, congestion; started about 2 days ago.    no fever or cough    Pt states that her two kids have colds also.    No rashes           Past Medical History  Disease Name Date Onset Notes   Asthma --  --    Nicotine dependence 10/31/2018 --    Pap smear for cervical cancer screening 9/2019 NORMAL EPW         Past Surgical History  Procedure Name Date Notes   *Denies any surgical procedures --  --          Medication List  Name Date Started Instructions   Compact Compressor Nebulizer miscellaneous misc 01/07/2020 use as directed   ipratropium-albuterol 0.5 mg-3 mg(2.5 mg base)/3 mL inhalation solution for nebulization 01/07/2020 inhale 3 milliliters by nebulization route 4 times per day and as needed, up to 6 doses per day for 30 days   Ventolin HFA 90 mcg/actuation inhalation HFA aerosol inhaler 05/28/2020 inhale 1 - 2 puffs (90 - 180 mcg) by inhalation route every 4-6 hours as needed for 30 days         Allergy List  Allergen Name Date Reaction Notes   NO KNOWN DRUG ALLERGIES --  --  --          Family Medical History  Disease Name Relative/Age Notes   Colon Cancer Grandfather (maternal)/   --          Reproductive History  Menstrual   Age Menarche: 10   Pregnancy Summary   Total Pregnancies: 2 Full Term: 2 Premature: 0   Ab  "Induced: 0 Ab Spontaneous: 0 Ectopics: 0   Multiples: 0 Livin         Social History  Finding Status Start/Stop Quantity Notes   Active but no formal exercise --  --/-- --  --    Alcohol Light --/-- --  2019 -    No known infection risk --  --/-- --  --     --  --/-- --  --    Tobacco Never --/-- --  --    Tobacco use --  --/-- --  smokes socially \"not every day\"         Review of Systems  · Constitutional  o Denies  o : fever, fatigue, weight loss, weight gain  · Cardiovascular  o Denies  o : lower extremity edema, claudication, chest pressure, palpitations  · Respiratory  o Denies  o : shortness of breath, wheezing, cough, hemoptysis, dyspnea on exertion  · Gastrointestinal  o Denies  o : nausea, vomiting, diarrhea, constipation, abdominal pain      Vitals  Date Time BP Position Site L\R Cuff Size HR RR TEMP (F) WT  HT  BMI kg/m2 BSA m2 O2 Sat        2020 03:11 /64 Sitting    78 - R   128lbs 0oz 5'  2\" 23.41 1.59 100 %          Physical Examination  · Constitutional  o Appearance  o : well developed, well-nourished, no acute distress  · Head and Face  o Head  o : normocephalic, atraumatic  · Ears, Nose, Mouth and Throat  o Ears  o :   § External Ears  § : external auditory canal appearance normal, no discharge present  § Otoscopic Examination  § : tympanic membranes pearly white/gray bilaterally  o Nose  o :   § External Nose  § : no lesions noted  § Nasopharynx  § : serous discharge present   o Oral Cavity  o :   § Oral Mucosa  § : oral mucosa light pink  o Throat  o :   § Oropharynx  § : oropharynx inflammation present, tonsils within normal limits  · Neck  o Inspection/Palpation  o : normal appearance, no masses or tenderness, trachea midline  o Thyroid  o : gland size normal, nontender, no nodules or masses present on palpation  · Respiratory  o Respiratory Effort  o : breathing unlabored  o Inspection of Chest  o : chest rise symmetric bilaterally  o Auscultation of Lungs  o : " clear to auscultation bilaterally throughout inspiration and expiration  · Cardiovascular  o Heart  o :   § Auscultation of Heart  § : regular rate and rhythm, no murmurs, gallops or rubs  o Peripheral Vascular System  o :   § Extremities  § : no edema  · Lymphatic  o Neck  o : no cervical lymphadenopathy, no supraclavicular lymphadenopathy  · Psychiatric  o Mood and Affect  o : mood normal, affect appropriate          Results  · In-Office Procedures  o Lab procedure  § Rapid strep screen (61696)   § Beta Strep Gp A Culture: Negative   § Internal Control Verified?: Yes       Assessment  · Asthma     493.90/J45.909  · Upper respiratory infection     465.9/J06.9      Plan  · Orders  o ACO-39: Current medications updated and reviewed () - - 07/28/2020  o [facility] NCOV2 (send-out) (34870) - - 07/28/2020  o Throat C+S (25749) - - 07/28/2020  · Medications  o amoxicillin 500 mg oral capsule   SIG: take 1 capsule (500 mg) by oral route 3 times per day   DISP: (30) capsules with 0 refills  Prescribed on 07/28/2020     o Medications have been Reconciled  o Transition of Care or Provider Policy  · Instructions  o Take all medications as prescribed/directed.  o Rest. Increase Fluids.  o Patient was educated/instructed on their diagnosis, treatment and medications prior to discharge from the clinic today.  o Discussed Covid-19 precautions including, but not limited to, social distancing, avoid touching your face, and hand washing.   o Covid test today at 4:45  · Disposition  o Call or Return if symptoms worsen or persist.  o Care Transition            Electronically Signed by: Mainor Larry PA-C -Author on July 29, 2020 09:19:20 PM

## 2021-05-13 NOTE — PROGRESS NOTES
Progress Note      Patient Name: Melina Styles   Patient ID: [id number]   Sex: Female   YOB: 2000    Primary Care Provider: Mainor Larry PA-C   Referring Provider: Mainor Larry PA-C    Visit Date: June 4, 2020    Provider: Mainor Larry PA-C   Location: [facility]   Location Address: [address] [place] [address], [address]  [place], [place]  [id number]   Location Phone: [phone]          Chief Complaint  · physical for job      History Of Present Illness  Melina Styles is a 20 year old /White female who presents for evaluation and treatment of: physical for job.      pt presents today requesting physical for job.    no refills needed at this time.    Pt is doing well.  No current issues    Patient states that she is do a physical for her new job and they also request that she have a TB skin test performed.    Patient has no current issues.  She states that she is doing well.  She is living in [place].    Patient has never had a positive TB skin test.           Past Medical History  Disease Name Date Onset Notes   Asthma --  --    Nicotine dependence 10/31/2018 --    Pap smear for cervical cancer screening 9/2019 NORMAL EPW         Past Surgical History  Procedure Name Date Notes   *Denies any surgical procedures --  --          Medication List  Name Date Started Instructions   Compact Compressor Nebulizer miscellaneous misc 01/07/2020 use as directed   ipratropium-albuterol 0.5 mg-3 mg(2.5 mg base)/3 mL inhalation solution for nebulization 01/07/2020 inhale 3 milliliters by nebulization route 4 times per day and as needed, up to 6 doses per day for 30 days   Ventolin HFA 90 mcg/actuation inhalation HFA aerosol inhaler 05/28/2020 inhale 1 - 2 puffs (90 - 180 mcg) by inhalation route every 4-6 hours as needed for 30 days         Allergy List  Allergen Name Date Reaction Notes   NO KNOWN DRUG ALLERGIES --  --  --          Family Medical History  Disease Name Relative/Age Notes   Colon Cancer  "Grandfather (maternal)/   --          Reproductive History  Menstrual   Age Menarche: 10   Pregnancy Summary   Total Pregnancies: 2 Full Term: 2 Premature: 0   Ab Induced: 0 Ab Spontaneous: 0 Ectopics: 0   Multiples: 0 Livin         Social History  Finding Status Start/Stop Quantity Notes   Active but no formal exercise --  --/-- --  --    Alcohol Light --/-- --  2019 -    No known infection risk --  --/-- --  --     --  --/-- --  --    Tobacco Never --/-- --  --    Tobacco use --  --/-- --  smokes socially \"not every day\"         Review of Systems  · Constitutional  o Denies  o : fever, fatigue, weight loss, weight gain  · Cardiovascular  o Denies  o : lower extremity edema, claudication, chest pressure, palpitations  · Respiratory  o Denies  o : shortness of breath, wheezing, cough, hemoptysis, dyspnea on exertion  · Gastrointestinal  o Denies  o : nausea, vomiting, diarrhea, constipation, abdominal pain      Vitals  Date Time BP Position Site L\R Cuff Size HR RR TEMP (F) WT  HT  BMI kg/m2 BSA m2 O2 Sat        2020 01:45 /66 Sitting    70 - R   134lbs 8oz 5'  2\" 24.6 1.63 100 %          Physical Examination  · Constitutional  o Appearance  o : well developed, well-nourished, no acute distress  · Head and Face  o Head  o : normocephalic, atraumatic  · Ears, Nose, Mouth and Throat  o Ears  o :   § External Ears  § : external auditory canal appearance normal, no discharge present  § Otoscopic Examination  § : tympanic membranes pearly white/gray bilaterally  o Nose  o :   § External Nose  § : no lesions noted  § Nasopharynx  § : no discharge present  o Oral Cavity  o :   § Oral Mucosa  § : oral mucosa light pink  o Throat  o :   § Oropharynx  § : tonsils without exudate, no palatal petechiae  · Neck  o Inspection/Palpation  o : normal appearance, no masses or tenderness, trachea midline  o Thyroid  o : gland size normal, nontender, no nodules or masses present on " palpation  · Respiratory  o Respiratory Effort  o : breathing unlabored  o Inspection of Chest  o : chest rise symmetric bilaterally  o Auscultation of Lungs  o : clear to auscultation bilaterally throughout inspiration and expiration  · Cardiovascular  o Heart  o :   § Auscultation of Heart  § : regular rate and rhythm, no murmurs, gallops or rubs  o Peripheral Vascular System  o :   § Extremities  § : no edema  · Lymphatic  o Neck  o : no cervical lymphadenopathy, no supraclavicular lymphadenopathy  · Psychiatric  o Mood and Affect  o : mood normal, affect appropriate          Assessment  · Annual physical exam     V70.0/Z00.00      Plan  · Orders  o ACO-39: Current medications updated and reviewed () - - 06/04/2020  o ACO-14: Influenza immunization administered or previously received () - - 06/04/2020  o TB isabella test (12230) - - 06/04/2020  · Medications  o Medications have been Reconciled  o Transition of Care or Provider Policy  · Instructions  o Reviewed health maintenance flowsheet and updated information. Orders were placed and/or patient's response was documented.  o Patient instructed/educated on their diet and exercise program.  o Patient was educated/instructed on their diagnosis, treatment and medications prior to discharge from the clinic today.  o Discussed Covid-19 precautions including, but not limited to, social distancing, avoid touching your face, and hand washing.   o Patient is cleared from a physical standpoint to begin her employment.  o Patient will return to the office on Monday for TB skin test due to the fact that we are closed on the weekends we cannot read a TB skin test on Saturday.  · Disposition  o Call or Return if symptoms worsen or persist.  o Care Transition            Electronically Signed by: Mainro Larry PA-C -Author on June 5, 2020 06:32:43 AM

## 2021-05-13 NOTE — PROGRESS NOTES
Progress Note      Patient Name: Melina Styles   Patient ID: [id number]   Sex: Female   YOB: 2000    Primary Care Provider: Mainor Larry PA-C   Referring Provider: Mainor Larry PA-C    Visit Date: November 19, 2020    Provider: Mainor Larry PA-C   Location: [facility]   Location Address: [address] [place] [address], [address]  [place], [place]  [id number]   Location Phone: [phone]          Chief Complaint  · COVID-19 positive      History Of Present Illness  Video Conferencing Visit  Melina Styles is a 20 year old /White female who is presenting for evaluation via video conferencing via [employer]. Verbal consent obtained before beginning visit.   The following staff were present during this visit: Min Wheeler MA/Mainor Larry PA-C   Melina Styles is a 20 year old /White female who presents for evaluation and treatment of: COVID-19 positive      Pt presents today after a positive COVID-19 result.     Pt was tested for COVID-19 11/18 at her work, [employer] in [place].     Pt c/o body aches, fatigue, headaches, loss of taste/smell since 11/16.    Pt stated she does not have a cough or soa, but she is concerned about her asthma.    No CP, some SOA, non prod cough  PMH of asthma         Past Medical History  Disease Name Date Onset Notes   Allergic rhinitis due to allergen 09/20/2020 --    Asthma --  --    Nicotine dependence 10/31/2018 --    Pap smear for cervical cancer screening 9/2019 NORMAL EPW         Past Surgical History  Procedure Name Date Notes   *Denies any surgical procedures --  --          Medication List  Name Date Started Instructions   Adderall 20 mg oral tablet  take 1 tablet (20 mg) by oral route 2 times per day before breakfast and at noon   Compact Compressor Nebulizer miscellaneous misc 09/21/2020 use as directed for 1 day   ipratropium-albuterol 0.5 mg-3 mg(2.5 mg base)/3 mL inhalation solution for nebulization 01/07/2020 inhale 3 milliliters  "by nebulization route 4 times per day and as needed, up to 6 doses per day for 30 days   omeprazole 40 mg oral capsule,delayed release(DR/EC) 2020 take 1 capsule (40 mg) by oral route once daily before a meal for 30 days   Symbicort 80-4.5 mcg/actuation inhalation HFA aerosol inhaler 2020 inhale 2 puffs by inhalation route 2 times per day in the morning and evening   Ventolin HFA 90 mcg/actuation inhalation HFA aerosol inhaler 2020 inhale 1 - 2 puffs (90 - 180 mcg) by inhalation route every 4-6 hours as needed for 30 days         Allergy List  Allergen Name Date Reaction Notes   NO KNOWN DRUG ALLERGIES --  --  --          Family Medical History  Disease Name Relative/Age Notes   Colon Cancer Grandfather (maternal)/   --          Reproductive History  Menstrual   Age Menarche: 10   Pregnancy Summary   Total Pregnancies: 2 Full Term: 2 Premature: 0   Ab Induced: 0 Ab Spontaneous: 0 Ectopics: 0   Multiples: 0 Livin         Social History  Finding Status Start/Stop Quantity Notes   Active but no formal exercise --  --/-- --  --    Alcohol Light --/-- --  2019 -    No known infection risk --  --/-- --  --     --  --/-- --  --    Tobacco Never --/-- --  --    Tobacco use --  --/-- --  smokes socially \"not every day\"         Review of Systems  · Constitutional  o Denies  o : fever, fatigue, weight loss, weight gain  · Cardiovascular  o Denies  o : lower extremity edema, claudication, chest pressure, palpitations  · Respiratory  o Admits  o : shortness of breath, cough  o Denies  o : wheezing, hemoptysis, dyspnea on exertion  · Gastrointestinal  o Denies  o : nausea, vomiting, diarrhea, constipation, abdominal pain      Physical Examination  · Constitutional  o Appearance  o : well-nourished, no acute distress  · Head and Face  o Head  o :   § Inspection  § : atraumatic, normocephalic  · Respiratory  o Respiratory Effort  o : breathing unlabored  · Skin and Subcutaneous Tissue  o General " Inspection  o : no visible rash, no lesions  · Neurologic  o Mental Status Examination  o :   § Orientation  § : grossly oriented to person, place and time, no facial droop          Assessment  · Asthma     493.90/J45.909  · Cough     786.2/R05  · Fatigue     780.79/R53.83  · Headache     784.0/R51  · COVID-19       COVID-19     519.8/U07.1  · Body aches     780.96/R52  · Loss of taste     781.1/R43.2  · Loss of smell     781.1/R43.0      Plan  · Orders  o ACO-39: Current medications updated and reviewed (1159F, ) - - 11/19/2020  · Medications  o Reusable Nebulizer Kit miscellaneous kit   SIG: use as directed DX: Asthma and Covid 19   DISP: (1) Package with 0 refills  Prescribed on 11/19/2020     o Decadron 4 mg oral tablet   SIG: take 1 tablet (4 mg) by oral route 2 times per day for 7 days   DISP: (14) Tablet with 0 refills  Prescribed on 11/19/2020     o ondansetron 8 mg oral tablet,disintegrating   SIG: dissolve 1 tablet by oral route every 8 hours PRN N/V   DISP: (20) Tablet with 0 refills  Prescribed on 11/19/2020     o Medications have been Reconciled  o Transition of Care or Provider Policy  · Instructions  o Take all medications as prescribed/directed.  o Rest. Increase Fluids.  o Patient was educated/instructed on their diagnosis, treatment and medications prior to discharge from the clinic today.  o Discussed Covid-19 precautions including, but not limited to, social distancing, avoid touching your face, and hand washing.   · Disposition  o Call or Return if symptoms worsen or persist.  o F/U 1 week  o Care Transition            Electronically Signed by: Mainor Larry PA-C -Author on November 22, 2020 10:30:47 AM

## 2021-05-13 NOTE — PROGRESS NOTES
Progress Note      Patient Name: Melina Styles   Patient ID: [id number]   Sex: Female   YOB: 2000    Primary Care Provider: Mainor Larry PA-C   Referring Provider: Mainor Larry PA-C    Visit Date: May 12, 2020    Provider: Mainor Larry PA-C   Location: [facility]   Location Address: [address] [place] [address], [address]  [place], [place]  [id number]   Location Phone: [phone]          Chief Complaint  · Sore throat      History Of Present Illness  Melina Styles is a 20 year old /White female who presents for evaluation and treatment of:      not feeling well    C/O sore throat, headache, body aches and congestion in her throat    Symptoms started this last night/this morning    Denies fever or chest congestion    Pt recently prescribed Tessalon Perles 100mg and penicillin on 04/21 - 4/23.       Past Medical History  Disease Name Date Onset Notes   Asthma --  --    Nicotine dependence 10/31/2018 --    Pap smear for cervical cancer screening 9/2019 NORMAL EPW         Past Surgical History  Procedure Name Date Notes   *Denies any surgical procedures --  --          Medication List  Name Date Started Instructions   Compact Compressor Nebulizer miscellaneous misc 01/07/2020 use as directed   ibuprofen 800 mg oral tablet  take 1 tablet (800 mg) by oral route 3 times per day with food   ipratropium-albuterol 0.5 mg-3 mg(2.5 mg base)/3 mL inhalation solution for nebulization 01/07/2020 inhale 3 milliliters by nebulization route 4 times per day and as needed, up to 6 doses per day for 30 days   triamcinolone acetonide 0.1 % topical ointment 04/21/2020 apply a thin layer to the affected area(s) by topical route 2 times per day   Ventolin HFA 90 mcg/actuation inhalation HFA aerosol inhaler 01/07/2020 inhale 1 - 2 puffs (90 - 180 mcg) by inhalation route every 4-6 hours as needed         Allergy List  Allergen Name Date Reaction Notes   NO KNOWN DRUG ALLERGIES --  --  --          Family Medical  "History  Disease Name Relative/Age Notes   Colon Cancer Grandfather (maternal)/   --          Reproductive History  Menstrual   Age Menarche: 10   Pregnancy Summary   Total Pregnancies: 2 Full Term: 2 Premature: 0   Ab Induced: 0 Ab Spontaneous: 0 Ectopics: 0   Multiples: 0 Livin         Social History  Finding Status Start/Stop Quantity Notes   Active but no formal exercise --  --/-- --  --    Alcohol Light --/-- --  2019 -    No known infection risk --  --/-- --  --     --  --/-- --  --    Tobacco Never --/-- --  --    Tobacco use --  --/-- --  smokes socially \"not every day\"         Review of Systems  · Constitutional  o Denies  o : fever, fatigue, weight loss, weight gain  · HENT  o Admits  o : sore throat  · Cardiovascular  o Denies  o : lower extremity edema, claudication, chest pressure, palpitations  · Respiratory  o Admits  o : cough  o Denies  o : shortness of breath, wheezing, hemoptysis, dyspnea on exertion  · Gastrointestinal  o Denies  o : nausea, vomiting, diarrhea, constipation, abdominal pain  · All Others Negative      Vitals  Date Time BP Position Site L\R Cuff Size HR RR TEMP (F) WT  HT  BMI kg/m2 BSA m2 O2 Sat HC       2020 03:00 /64 Sitting    82 - R  98  5'  2\"   97 %          Physical Examination  · Constitutional  o Appearance  o : well developed, well-nourished, no acute distress  · Head and Face  o Head  o : normocephalic, atraumatic  · Ears, Nose, Mouth and Throat  o Ears  o :   § External Ears  § : external auditory canal appearance normal, no discharge present  § Otoscopic Examination  § : tympanic membranes pearly white/gray bilaterally  o Nose  o :   § External Nose  § : no lesions noted  § Nasopharynx  § : no discharge present  o Oral Cavity  o :   § Oral Mucosa  § : oral mucosa light pink  o Throat  o :   § Oropharynx  § : oropharynx inflammation present, tonsils within normal limits  · Neck  o Inspection/Palpation  o : tenderness present "   o Thyroid  o : gland size normal, nontender, no nodules or masses present on palpation  · Respiratory  o Respiratory Effort  o : breathing unlabored  o Inspection of Chest  o : chest rise symmetric bilaterally  o Auscultation of Lungs  o : clear to auscultation bilaterally throughout inspiration and expiration  · Cardiovascular  o Heart  o :   § Auscultation of Heart  § : regular rate and rhythm, no murmurs, gallops or rubs  o Peripheral Vascular System  o :   § Extremities  § : no edema  · Lymphatic  o Neck  o : no cervical lymphadenopathy, no supraclavicular lymphadenopathy  · Psychiatric  o Mood and Affect  o : mood normal, affect appropriate          Results  · In-Office Procedures  o Lab procedure  § IOP - Influenza A/B Test (05215)   § Influenza A: Negative   § Influenza B: Negative   § Internal Control Verified?: Yes   § IOP - Rapid Strep (49830)   § Beta Strep Gp A Culture: Positive   § Internal Control Verified?: Yes       Assessment  · Headache     784.0/R51  · Sore throat     462/J02.9  · Congestion of throat     784.99/R68.89  · Body aches     780.96/R52  · Strep pharyngitis     034.0/J02.0      Plan  · Orders  o ACO-39: Current medications updated and reviewed () - - 05/12/2020  · Medications  o Augmentin 875-125 mg oral tablet   SIG: take 1 tablet by oral route every 12 hours for 10 days   DISP: (20) tablets with 0 refills  Prescribed on 05/12/2020     o Medications have been Reconciled  o Transition of Care or Provider Policy  · Instructions  o Patient was educated/instructed on their diagnosis, treatment and medications prior to discharge from the clinic today.            Electronically Signed by: Mainor Larry PA-C -Author on May 12, 2020 03:20:43 PM

## 2021-05-13 NOTE — PROGRESS NOTES
Progress Note      Patient Name: Melina Styles   Patient ID: [id number]   Sex: Female   YOB: 2000    Primary Care Provider: Mainor Larry PA-C   Referring Provider: Mainor Larry PA-C    Visit Date: December 3, 2020    Provider: Mainor Larry PA-C   Location: [facility] [place]   Location Address: [address] [place] [address], [address]  [place], [place]  [id number]   Location Phone: [phone]          Chief Complaint  · follow up on Covid      History Of Present Illness  Video Conferencing Visit  Melina Styles is a 20 year old /White female who is presenting for evaluation via video conferencing via [employer]. Verbal consent obtained before beginning visit.   The following staff were present during this visit: Eli Jones CMA/Mainor Larry PA-C   Melina Styles is a 20 year old /White female who presents for evaluation and treatment of: follow up on Covid.      pt presents today for follow up on Covid.    pt tested positive for Covid on 11/18; pt states she is feeling better and no longer experiencing symptoms.      no other issues or concerns to discuss.    Patient has returned to work.  She is feeling much better.  She states her asthma is controlled.       Past Medical History  Disease Name Date Onset Notes   Allergic rhinitis due to allergen 09/20/2020 --    Asthma --  --    COVID-19 11/19/2020 --    Nicotine dependence 10/31/2018 --    Pap smear for cervical cancer screening 9/2019 NORMAL EP[facility]         Past Surgical History  Procedure Name Date Notes   *Denies any surgical procedures --  --          Medication List  Name Date Started Instructions   Adderall 20 mg oral tablet  take 1 tablet (20 mg) by oral route 2 times per day before breakfast and at noon   Compact Compressor Nebulizer miscellaneous misc 09/21/2020 use as directed for 1 day   Decadron 4 mg oral tablet 11/19/2020 take 1 tablet (4 mg) by oral route 2 times per day for 7 days   ipratropium-albuterol 0.5 mg-3  "mg(2.5 mg base)/3 mL inhalation solution for nebulization 2020 inhale 3 milliliters by nebulization route 4 times per day and as needed, up to 6 doses per day for 30 days   omeprazole 40 mg oral capsule,delayed release(DR/EC) 2020 take 1 capsule (40 mg) by oral route once daily before a meal for 30 days   ondansetron 8 mg oral tablet,disintegrating 2020 dissolve 1 tablet by oral route every 8 hours PRN N/V   Reusable Nebulizer Kit miscellaneous kit 2020 use as directed DX: Asthma and Covid 19   Symbicort 80-4.5 mcg/actuation inhalation HFA aerosol inhaler 2020 inhale 2 puffs by inhalation route 2 times per day in the morning and evening   Ventolin HFA 90 mcg/actuation inhalation HFA aerosol inhaler 2020 inhale 1 - 2 puffs (90 - 180 mcg) by inhalation route every 4-6 hours as needed for 30 days         Allergy List  Allergen Name Date Reaction Notes   NO KNOWN DRUG ALLERGIES --  --  --        Allergies Reconciled  Family Medical History  Disease Name Relative/Age Notes   Colon Cancer Grandfather (maternal)/   --          Reproductive History  Menstrual   Age Menarche: 10   Pregnancy Summary   Total Pregnancies: 2 Full Term: 2 Premature: 0   Ab Induced: 0 Ab Spontaneous: 0 Ectopics: 0   Multiples: 0 Livin         Social History  Finding Status Start/Stop Quantity Notes   Active but no formal exercise --  --/-- --  --    Alcohol Light --/-- --  2019 -    No known infection risk --  --/-- --  --     --  --/-- --  --    Tobacco Never --/-- --  --    Tobacco use --  --/-- --  smokes socially \"not every day\"         Review of Systems  · Constitutional  o Denies  o : fever, fatigue, weight loss, weight gain  · Cardiovascular  o Denies  o : lower extremity edema, claudication, chest pressure, palpitations  · Respiratory  o Denies  o : shortness of breath, wheezing, cough, hemoptysis, dyspnea on exertion  · Gastrointestinal  o Denies  o : nausea, vomiting, diarrhea, " constipation, abdominal pain      Physical Examination  · Constitutional  o Appearance  o : well-nourished, no acute distress  · Head and Face  o Head  o :   § Inspection  § : atraumatic, normocephalic  · Respiratory  o Respiratory Effort  o : breathing comfortably, no cough  · Skin and Subcutaneous Tissue  o General Inspection  o : no visible rash, no lesions  · Neurologic  o Mental Status Examination  o :   § Orientation  § : grossly oriented to person, place and time, no facial droop          Assessment  · Asthma     493.90/J45.909  · COVID-19       COVID-19     519.8/U07.1      Plan  · Orders  o ACO-39: Current medications updated and reviewed (, 1159F) - - 12/03/2020  · Medications  o Decadron 4 mg oral tablet   SIG: take 1 tablet (4 mg) by oral route 2 times per day for 7 days   DISP: (14) Tablet with 0 refills  Discontinued on 12/03/2020     o Medications have been Reconciled  o Transition of Care or Provider Policy  · Instructions  o Take all medications as prescribed/directed.  o Rest. Increase Fluids.  o Patient was educated/instructed on their diagnosis, treatment and medications prior to discharge from the clinic today.  o Discussed Covid-19 precautions including, but not limited to, social distancing, avoid touching your face, and hand washing.   · Disposition  o Call or Return if symptoms worsen or persist.  o Care Transition            Electronically Signed by: Mainor Larry PA-C -Author on December 3, 2020 01:19:28 PM

## 2021-05-13 NOTE — PROGRESS NOTES
Progress Note      Patient Name: Melina Styles   Patient ID: [id number]   Sex: Female   YOB: 2000    Primary Care Provider: Mainor Larry PA-C   Referring Provider: Mainor Larry PA-C    Visit Date: September 21, 2020    Provider: Mainor Larry PA-C   Location: [facility] [place]   Location Address: [address] [place] [address], [address]  [place], [place]  [id number]   Location Phone: [phone]          Chief Complaint  · One month f/u      History Of Present Illness  Video Conferencing Visit  Melina Styles is a 20 year old /White female who is presenting for evaluation via video conferencing via [employer]. Verbal consent obtained before beginning visit.   The following staff were present during this visit: Min Wheeler MA/ Mainor Larry PA-C      Pt presents today for a one month f/u.     Pt offers no complaints at this time. Pt states her asthma has been better.     Pt does not need any refills on medications at this time. Reports she is taking Adderall 20 mg BID prescribed by Abrahan Cloud.      Pts last pap done by EPW 08/2020.    Pt is needing her new neb machine    No current asthma attacks, using Symbicort daily and rescue inhal occ.   Melina Styles is a 20 year old /White female who presents for evaluation and treatment of: one month f/u       Past Medical History  Disease Name Date Onset Notes   Allergic rhinitis due to allergen 09/20/2020 --    Asthma --  --    Nicotine dependence 10/31/2018 --    Pap smear for cervical cancer screening 9/2019 NORMAL EPW         Past Surgical History  Procedure Name Date Notes   *Denies any surgical procedures --  --          Medication List  Name Date Started Instructions   Adderall 20 mg oral tablet  take 1 tablet (20 mg) by oral route 2 times per day before breakfast and at noon   Compact Compressor Nebulizer miscellaneous misc 01/07/2020 use as directed   ipratropium-albuterol 0.5 mg-3 mg(2.5 mg base)/3 mL inhalation solution for  "nebulization 2020 inhale 3 milliliters by nebulization route 4 times per day and as needed, up to 6 doses per day for 30 days   Symbicort 80-4.5 mcg/actuation inhalation HFA aerosol inhaler 2020 inhale 2 puffs by inhalation route 2 times per day in the morning and evening   Ventolin HFA 90 mcg/actuation inhalation HFA aerosol inhaler 2020 inhale 1 - 2 puffs (90 - 180 mcg) by inhalation route every 4-6 hours as needed for 30 days         Allergy List  Allergen Name Date Reaction Notes   NO KNOWN DRUG ALLERGIES --  --  --          Family Medical History  Disease Name Relative/Age Notes   Colon Cancer Grandfather (maternal)/   --          Reproductive History  Menstrual   Age Menarche: 10   Pregnancy Summary   Total Pregnancies: 2 Full Term: 2 Premature: 0   Ab Induced: 0 Ab Spontaneous: 0 Ectopics: 0   Multiples: 0 Livin         Social History  Finding Status Start/Stop Quantity Notes   Active but no formal exercise --  --/-- --  --    Alcohol Light --/-- --  2019 -    No known infection risk --  --/-- --  --     --  --/-- --  --    Tobacco Never --/-- --  --    Tobacco use --  --/-- --  smokes socially \"not every day\"         Review of Systems  · Constitutional  o Denies  o : fever, fatigue, weight loss, weight gain  · Cardiovascular  o Denies  o : pedal edema, claudication, chest pressure, palpitations  · Respiratory  o Denies  o : shortness of breath, wheezing, cough, hemoptysis, dyspnea on exertion  · Gastrointestinal  o Denies  o : nausea, vomiting, diarrhea, constipation, abdominal pain      Physical Examination  · Constitutional  o Appearance  o : well-nourished, no acute distress  · Head and Face  o Head  o :   § Inspection  § : atraumatic, normocephalic  · Respiratory  o Respiratory Effort  o : breathing comfortably, no cough  · Skin and Subcutaneous Tissue  o General Inspection  o : no visible rash, no lesions  · Neurologic  o Mental Status Examination  o : "   § Orientation  § : grossly oriented to person, place and time, no facial droop              Assessment  · Allergic rhinitis due to allergen     477.9/J30.9  · Asthma     493.90/J45.909      Plan  · Orders  o ACO-39: Current medications updated and reviewed () - - 09/20/2020  · Medications  o Compact Compressor Nebulizer miscellaneous misc   SIG: use as directed   DISP: (1) Kit with 0 refills  Refilled on 09/21/2020     o Symbicort 80-4.5 mcg/actuation inhalation HFA aerosol inhaler   SIG: inhale 2 puffs by inhalation route 2 times per day in the morning and evening   DISP: (1) 6.9 gm aer w/adap with 11 refills  Refilled on 09/21/2020     o amoxicillin 500 mg oral capsule   SIG: take 1 capsule (500 mg) by oral route 3 times per day   DISP: (30) capsules with 0 refills  Discontinued on 09/20/2020     o Medrol ([place]) 4 mg oral tablets,dose pack   SIG: take by oral route as directed per package instructions   DISP: (1) 21 ct dose-pack with 0 refills  Discontinued on 09/20/2020     o Medications have been Reconciled  o Transition of Care or Provider Policy  · Instructions  o Take all medications as prescribed/directed.  o Patient instructed/educated on their diet and exercise program.  o Patient was educated/instructed on their diagnosis, treatment and medications prior to discharge from the clinic today.  o Discussed Covid-19 precautions including, but not limited to, social distancing, avoid touching your face, and hand washing.   · Disposition  o Call or Return if symptoms worsen or persist.  o F/U in 4-6 months  o Care Transition            Electronically Signed by: Mainor Larry PA-C -Author on September 21, 2020 08:59:56 AM

## 2021-05-14 NOTE — PROGRESS NOTES
Progress Note      Patient Name: Melina Styles   Patient ID: [id number]   Sex: Female   YOB: 2000    Primary Care Provider: Mainor Larry PA-C   Referring Provider: Mainor Larry PA-C    Visit Date: January 4, 2021    Provider: Mainor Larry PA-C   Location: [facility]   Location Address: [facility], [address]  [place], [place]  [id number]   Location Phone: [phone]          Chief Complaint  · Hives      History Of Present Illness  Melina Styles is a 20 year old /White female who presents for evaluation and treatment of: hives      Pt c/o hives since Friday night (1/1/21). Pt stated she used a bath bomb and noticed hives later that night.     Pt has been taking Benadryl and Zyrtec. Pt was seen yesterday at [facility] and received a steroid shot.    Pt notes her (L) arm is swollen from hives. Pt stated she has been trying not to scratch.    Pt has been doing hot yoga.    No CP, SOA, dysphagia         Past Medical History  Disease Name Date Onset Notes   Allergic rhinitis due to allergen 09/20/2020 --    Asthma --  --    COVID-19 11/19/2020 --    Nicotine dependence 10/31/2018 --    Pap smear for cervical cancer screening 9/2019 NORMAL EPW         Past Surgical History  Procedure Name Date Notes   *Denies any surgical procedures --  --          Medication List  Name Date Started Instructions   Compact Compressor Nebulizer miscellaneous misc 09/21/2020 use as directed for 1 day   ipratropium-albuterol 0.5 mg-3 mg(2.5 mg base)/3 mL inhalation solution for nebulization 01/07/2020 inhale 3 milliliters by nebulization route 4 times per day and as needed, up to 6 doses per day for 30 days   Reusable Nebulizer Kit miscellaneous kit 11/19/2020 use as directed DX: Asthma and Covid 19   Symbicort 80-4.5 mcg/actuation inhalation HFA aerosol inhaler 09/21/2020 inhale 2 puffs by inhalation route 2 times per day in the morning and evening   Ventolin HFA 90  "mcg/actuation inhalation HFA aerosol inhaler 2020 inhale 1 - 2 puffs (90 - 180 mcg) by inhalation route every 4-6 hours as needed for 30 days         Allergy List  Allergen Name Date Reaction Notes   NO KNOWN DRUG ALLERGIES --  --  --        Allergies Reconciled  Family Medical History  Disease Name Relative/Age Notes   Colon Cancer Grandfather (maternal)/   --          Reproductive History  Menstrual   Age Menarche: 10   Pregnancy Summary   Total Pregnancies: 2 Full Term: 2 Premature: 0   Ab Induced: 0 Ab Spontaneous: 0 Ectopics: 0   Multiples: 0 Livin         Social History  Finding Status Start/Stop Quantity Notes   Active but no formal exercise --  --/-- --  --    Alcohol Light --/-- --  2019 -    No known infection risk --  --/-- --  --     --  --/-- --  --    Tobacco Never --/-- --  --    Tobacco use --  --/-- --  smokes socially \"not every day\"         Review of Systems  · Constitutional  o Denies  o : fever, fatigue, weight loss, weight gain  · Cardiovascular  o Denies  o : lower extremity edema, claudication, chest pressure, palpitations  · Respiratory  o Denies  o : shortness of breath, wheezing, cough, hemoptysis, dyspnea on exertion  · Gastrointestinal  o Denies  o : nausea, vomiting, diarrhea, constipation, abdominal pain      Physical Examination  · Constitutional  o Appearance  o : well-nourished, no acute distress  · Head and Face  o Head  o :   § Inspection  § : atraumatic, normocephalic  · Respiratory  o Respiratory Effort  o : breathing comfortably, no cough  · Neurologic  o Mental Status Examination  o :   § Orientation  § : grossly oriented to person, place and time, no facial droop          Assessment  · Hives     708.9/L50.9  · Urticaria     708.9/L50.9      Plan  · Medications  o Medications have been Reconciled  o Transition of Care or Provider Policy  · Instructions  o Take all medications as prescribed/directed.  o Patient was educated/instructed on their " diagnosis, treatment and medications prior to discharge from the clinic today.  o Add Zyrtec 10mg BID, pt had steroid shot            Electronically Signed by: Mainor Larry PA-C -Author on January 4, 2021 10:18:25 AM

## 2021-05-14 NOTE — PROGRESS NOTES
Progress Note      Patient Name: Melina Styles   Patient ID: [id number]   Sex: Female   YOB: 2000    Primary Care Provider: Mainor Larry PA-C   Referring Provider: Mainor Larry PA-C    Visit Date: January 8, 2021    Provider: Mainor Larry PA-C   Location: [facility]   Location Address: [address] [place] [address], [address]  [place], [place]  [id number]   Location Phone: [phone]          Chief Complaint  · Hives      History Of Present Illness  Video Conferencing Visit  Melina Styles is a 20 year old /White female who is presenting for evaluation via video conferencing via [employer]. Verbal consent obtained before beginning visit.   The following staff were present during this visit: Min Wheeler MA/Mainor Larry PA-C   Melina Styles is a 20 year old /White female who presents for evaluation and treatment of: hives      Pt c/o hives since 1/1/21. Pt first noticed hives after using a bath bomb.     Pt thinks she may be allergic to latex. She noted she has been using a yoga mat made of latex but has not discontinued use. Pt also thinks the hives may be from new shampoo, body wash, detergent. Pt notes she uses new products every week.     Pt is currently taking prednisone 10mg qd and Zyrtec 10mg bid.    Pt cont to have hives.      Asthma - good control with neb tx Symbicort and Ventolin    Urticaria - poor control with prednisone and zyrtec       Past Medical History  Disease Name Date Onset Notes   Allergic rhinitis due to allergen 09/20/2020 --    Asthma --  --    COVID-19 11/19/2020 --    Nicotine dependence 10/31/2018 --    Pap smear for cervical cancer screening 9/2019 NORMAL EPW         Past Surgical History  Procedure Name Date Notes   *Denies any surgical procedures --  --          Medication List  Name Date Started Instructions   Compact Compressor Nebulizer miscellaneous misc 09/21/2020 use as directed for 1 day   ipratropium-albuterol 0.5 mg-3 mg(2.5 mg  "base)/3 mL inhalation solution for nebulization 2020 inhale 3 milliliters by nebulization route 4 times per day and as needed, up to 6 doses per day for 30 days   prednisone 10 mg oral tablet  take 1 tablet (10 mg) by oral route once daily   Reusable Nebulizer Kit miscellaneous kit 2020 use as directed DX: Asthma and Covid 19   Symbicort 80-4.5 mcg/actuation inhalation HFA aerosol inhaler 2020 inhale 2 puffs by inhalation route 2 times per day in the morning and evening   Ventolin HFA 90 mcg/actuation inhalation HFA aerosol inhaler 2020 inhale 1 - 2 puffs (90 - 180 mcg) by inhalation route every 4-6 hours as needed for 30 days   Zyrtec 10 mg oral capsule  take 1 capsule by oral route 2 times a day         Allergy List  Allergen Name Date Reaction Notes   NO KNOWN DRUG ALLERGIES --  --  --          Family Medical History  Disease Name Relative/Age Notes   Colon Cancer Grandfather (maternal)/   --          Reproductive History  Menstrual   Age Menarche: 10   Pregnancy Summary   Total Pregnancies: 2 Full Term: 2 Premature: 0   Ab Induced: 0 Ab Spontaneous: 0 Ectopics: 0   Multiples: 0 Livin         Social History  Finding Status Start/Stop Quantity Notes   Active but no formal exercise --  --/-- --  --    Alcohol Light --/-- --  2019 -    No known infection risk --  --/-- --  --     --  --/-- --  --    Tobacco Never --/-- --  --    Tobacco use --  --/-- --  smokes socially \"not every day\"         Review of Systems  · Constitutional  o Denies  o : fever, fatigue, weight loss, weight gain  · Cardiovascular  o Denies  o : lower extremity edema, claudication, chest pressure, palpitations  · Respiratory  o Denies  o : shortness of breath, wheezing, cough, hemoptysis, dyspnea on exertion  · Gastrointestinal  o Denies  o : nausea, vomiting, diarrhea, constipation, abdominal pain      Physical Examination  · Constitutional  o Appearance  o : well-nourished, no acute distress  · Head " and Face  o Head  o :   § Inspection  § : atraumatic, normocephalic  · Respiratory  o Respiratory Effort  o : breathing comfortably, no cough  · Neurologic  o Mental Status Examination  o :   § Orientation  § : grossly oriented to person, place and time, no facial droop          Assessment  · Asthma     493.90/J45.909  · Hives     708.9/L50.9  · Urticaria     708.9/L50.9      Plan  · Orders  o ACO-39: Current medications updated and reviewed (, 1159F) - - 01/08/2021  o ALLERGY CONSULTATION ([facility]AY) - 708.9/L50.9, 493.90/J45.909 - 01/08/2021   [place]  · Medications  o Medications have been Reconciled  o Transition of Care or Provider Policy  · Instructions  o Patient was educated/instructed on their diagnosis, treatment and medications prior to discharge from the clinic today.  o Discussed Covid-19 precautions including, but not limited to, social distancing, avoid touching your face, and hand washing.   · Disposition  o Call or Return if symptoms worsen or persist.  o Care Transition            Electronically Signed by: Mainor Larry PA-C -Author on January 8, 2021 08:35:02 AM
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
## 2021-05-15 VITALS
SYSTOLIC BLOOD PRESSURE: 111 MMHG | WEIGHT: 128 LBS | BODY MASS INDEX: 23.55 KG/M2 | HEART RATE: 78 BPM | DIASTOLIC BLOOD PRESSURE: 64 MMHG | OXYGEN SATURATION: 100 % | HEIGHT: 62 IN

## 2021-05-15 VITALS
HEART RATE: 87 BPM | DIASTOLIC BLOOD PRESSURE: 60 MMHG | WEIGHT: 140 LBS | SYSTOLIC BLOOD PRESSURE: 120 MMHG | TEMPERATURE: 98.2 F | HEIGHT: 62 IN | OXYGEN SATURATION: 98 % | BODY MASS INDEX: 25.76 KG/M2

## 2021-05-15 VITALS
HEART RATE: 62 BPM | DIASTOLIC BLOOD PRESSURE: 62 MMHG | WEIGHT: 126.37 LBS | OXYGEN SATURATION: 100 % | HEIGHT: 62 IN | BODY MASS INDEX: 23.25 KG/M2 | SYSTOLIC BLOOD PRESSURE: 134 MMHG

## 2021-05-15 VITALS
BODY MASS INDEX: 24.75 KG/M2 | WEIGHT: 134.5 LBS | HEART RATE: 70 BPM | OXYGEN SATURATION: 100 % | HEIGHT: 62 IN | SYSTOLIC BLOOD PRESSURE: 116 MMHG | DIASTOLIC BLOOD PRESSURE: 66 MMHG

## 2021-05-15 VITALS
SYSTOLIC BLOOD PRESSURE: 118 MMHG | HEIGHT: 62 IN | BODY MASS INDEX: 29.45 KG/M2 | DIASTOLIC BLOOD PRESSURE: 64 MMHG | HEART RATE: 82 BPM | TEMPERATURE: 98 F | OXYGEN SATURATION: 97 %

## 2021-05-15 VITALS
HEIGHT: 62 IN | DIASTOLIC BLOOD PRESSURE: 68 MMHG | OXYGEN SATURATION: 98 % | HEART RATE: 86 BPM | BODY MASS INDEX: 23 KG/M2 | SYSTOLIC BLOOD PRESSURE: 122 MMHG | WEIGHT: 125 LBS

## 2021-05-15 VITALS
HEART RATE: 93 BPM | WEIGHT: 132 LBS | OXYGEN SATURATION: 98 % | HEIGHT: 62 IN | BODY MASS INDEX: 24.29 KG/M2 | SYSTOLIC BLOOD PRESSURE: 121 MMHG | DIASTOLIC BLOOD PRESSURE: 80 MMHG

## 2021-05-16 VITALS
SYSTOLIC BLOOD PRESSURE: 125 MMHG | HEIGHT: 62 IN | OXYGEN SATURATION: 100 % | HEART RATE: 94 BPM | WEIGHT: 114 LBS | BODY MASS INDEX: 20.98 KG/M2 | DIASTOLIC BLOOD PRESSURE: 72 MMHG

## 2021-05-16 VITALS
OXYGEN SATURATION: 98 % | SYSTOLIC BLOOD PRESSURE: 121 MMHG | WEIGHT: 109 LBS | DIASTOLIC BLOOD PRESSURE: 69 MMHG | HEART RATE: 79 BPM

## 2021-05-16 VITALS
HEIGHT: 62 IN | OXYGEN SATURATION: 100 % | BODY MASS INDEX: 20.8 KG/M2 | WEIGHT: 113 LBS | SYSTOLIC BLOOD PRESSURE: 102 MMHG | DIASTOLIC BLOOD PRESSURE: 55 MMHG | HEART RATE: 76 BPM

## 2021-05-16 VITALS
DIASTOLIC BLOOD PRESSURE: 64 MMHG | WEIGHT: 116.25 LBS | HEART RATE: 82 BPM | HEIGHT: 62 IN | SYSTOLIC BLOOD PRESSURE: 114 MMHG | BODY MASS INDEX: 21.39 KG/M2

## 2021-05-26 ENCOUNTER — OFFICE VISIT CONVERTED (OUTPATIENT)
Dept: FAMILY MEDICINE CLINIC | Facility: CLINIC | Age: 21
End: 2021-05-26
Attending: NURSE PRACTITIONER

## 2021-06-05 NOTE — PROGRESS NOTES
Progress Note      Patient Name: Melina Styles   Patient ID: [id number]   Sex: Female   YOB: 2000    Primary Care Provider: Mainor Larry PA-C   Referring Provider: Mainor Larry PA-C    Visit Date: May 26, 2021    Provider: RAJNI Warren   [place] [facility] [place]   Location Address: [address] [place] [address], [address]  [place], [place]  [id number]   Location Phone: [phone]          Chief Complaint  · CONGESTION  · SINUS PRESSURE  · HEADACHE      History Of Present Illness  Melina Styles is a 21 year old /White female who presents for evaluation and treatment of:      Patient is here today w cc of congestion, sinus pressure and a headache in mornings for a couple of weeks now. Denies fever, chills, body aches. Admits sore throat in the am due to the drainage.     Says they don't have central air they have window units which could be the cause. States she tried Zyrtec but really need decongestant that will not make her drowsy since she already has narcolepsy. She has also tried ness seltzer w/decongestant. States her allergies have flared since she moved to a wooded area in [facility]. Admits she does have a hx of Asthma which is sport induced or allergy induced. States it it under good control as long as she uses her Symbicort Daily. Admits rare use of albuterol. She has been on singulair in the past but she does not feel it helped her symptoms. Switched from Zyrtec to Allegra today. Kenalog 80mg IM administered in the office today for allergy symptoms. Instructed to follow up w/Paulino BATISTA if symptoms not improved.    states she just found out the other day she was allergic to latex -she was breaking out from a yoga mat.       Past Medical History  Disease Name Date Onset Notes   Allergic rhinitis due to allergen 09/20/2020 --    Asthma --  --    COVID-19 11/19/2020 --    Nicotine dependence 10/31/2018 --    Pap smear for cervical cancer screening 9/2019 NORMAL EPW          Past Surgical History  Procedure Name Date Notes   *Denies any surgical procedures --  --          Medication List  Name Date Started Instructions   Compact Compressor Nebulizer miscellaneous misc 2020 use as directed for 1 day   diclofenac sodium 50 mg oral tablet,delayed release (DR/EC) 2021 take 1 tablet (50 mg) by oral route 2 times per day   ipratropium-albuterol 0.5 mg-3 mg(2.5 mg base)/3 mL inhalation solution for nebulization 2020 inhale 3 milliliters by nebulization route 4 times per day and as needed, up to 6 doses per day for 30 days   Reusable Nebulizer Kit miscellaneous kit 2020 use as directed DX: Asthma and Covid 19   Symbicort 80-4.5 mcg/actuation inhalation HFA aerosol inhaler 2020 inhale 2 puffs by inhalation route 2 times per day in the morning and evening   Ventolin HFA 90 mcg/actuation inhalation HFA aerosol inhaler 2020 inhale 1 - 2 puffs (90 - 180 mcg) by inhalation route every 4-6 hours as needed for 30 days         Allergy List  Allergen Name Date Reaction Notes   NO KNOWN DRUG ALLERGIES --  --  --          Family Medical History  Disease Name Relative/Age Notes   Colon Cancer Grandfather (maternal)/   --          Reproductive History  Menstrual   Age Menarche: 10   Pregnancy Summary   Total Pregnancies: 2 Full Term: 2 Premature: 0   Ab Induced: 0 Ab Spontaneous: 0 Ectopics: 0   Multiples: 0 Livin         Social History  Finding Status Start/Stop Quantity Notes   Active but no formal exercise --  --/-- --  --    Alcohol Light --/-- --  2021 - 2019 -    No known infection risk --  --/-- --  --     --  --/-- --  --    Tobacco Never --/-- --  --          Immunizations  NameDate Admin Mfg Trade Name Lot Number Route Inj VIS Given VIS Publication   Tdap2021 SKB BOOSTRIX [id number] IM RD 2021    Comments: pt tolerated well         Review of Systems  · Constitutional  o Denies  o : fever, fatigue, weight loss, weight  "gain  · Eyes  o Admits  o : discharge from eye, eye discomfort  · HENT  o Admits  o : headaches, nasal congestion, nasal discharge, postnasal drip, sore throat  o Denies  o : vertigo, lightheadedness, sinus pain, hearing loss, ear pain, ear fullness  · Cardiovascular  o Denies  o : lower extremity edema, claudication, chest pressure, palpitations  · Respiratory  o Denies  o : shortness of breath, wheezing, cough, hemoptysis, dyspnea on exertion  · Gastrointestinal  o Denies  o : nausea, vomiting, diarrhea, constipation, abdominal pain      Vitals  Date Time BP Position Site L\R Cuff Size HR RR TEMP (F) WT  HT  BMI kg/m2 BSA m2 O2 Sat FR L/min FiO2 HC       08/17/2020 02:35 /68 Sitting    86 - R   125lbs 0oz 5'  2\" 22.86 1.57 98 %      05/26/2021 10:39 /64 Sitting    85 - R   117lbs 4oz 5'  2\" 21.45 1.53 100 %            Physical Examination  · Constitutional  o Appearance  o : alert, in no acute distress, well developed, well-nourished  · Eyes  o Vision  o : Conjuntivae: Normal, Sclerae white, Pupils: PERRL, Cornea: Clear, no lesions bilateral  · Ears, Nose, Mouth and Throat  o Ears  o : Ext. Ears: Normal shape, Non tender, EACs: Normal , TMs: large amount of clear fluid behind TMs, Hearing: intact to conversational voice bilaterally  o Nose  o : No nasal discharge, Mucosa: normal, Septum: midline, Sinuses: Nontender  o Throat  o : Oropharynx: cobblestonning, Tonsils: absent  · Neck  o Inspection/Palpation  o : Supple, no masses or tenderness, no deformities, Trachea: Midline, ROM: with in normal limits, no neck stiffness, no lymphadenopathy  o Thyroid  o : no thyomegaly, no palpabale masses   · Respiratory  o Auscultation of Lungs  o : normal breath sounds throughout, no wheeze, rhonchi, or crackles  · Cardiovascular  o Heart  o : Regular rate and rhythm, Normal S1,S2 , No cardiac murmers, No S3 or S4 gallop or rubs  · Skin and Subcutaneous Tissue  o General Inspection  o : no rashes, normal skin color, " warm and dry  o Digits and Nails  o : no clubbing, cyanosis, deformities or edema present, normal appearing nails  · Neurologic  o Mental Status Examination  o : alert and oriented to time, place, and person. Gait and Station: normal gait, able to stand without difficulty  · Psychiatric  o Judgement and Insight  o : judgment and insight intact  o Mood and Affect  o : normal mood and affect              Assessment  · Asthma     493.90/J45.909  · Headache     784.0/R51  · Congestion of nasal sinus     478.19/R09.81  · Sinus pressure     478.19/J34.89  · Seasonal allergies     477.9/J30.2      Plan  · Orders  o IM - Injection Fee [facility] (38284) - 478.19/R09.81, 784.0/R51, 478.19/J34.89, 477.9/J30.2 - 05/26/2021  o ACO-14: Influenza immunization was not administered for reasons documented [facility] () - - 05/26/2021  o ACO-39: Current medications updated and reviewed (, 1159F) - - 05/26/2021  o ALLERGY CONSULTATION (ALLEG) - 477.9/J30.2, 493.90/J45.909 - 05/26/2021  o 8.00 - Kenalog Injection 80mg (-9) - 478.19/R09.81, 784.0/R51, 478.19/J34.89, 477.9/J30.2 - 05/26/2021   Injection - Kenalog 80 mg; Dose: 80 mg; Site: Right Gluteus; Route: intramuscular; Date: 05/26/2021 12:12:17; Exp: 08/01/2022; Lot: [id number]; Mfg: [employer] TradeName: triamcinolone; Location: [facility] [place]; Administered By: Yolanda Jacinto MA; Comment: PATIENT TOLERATED WELL.  · Medications  o Allegra Allergy 180 mg oral tablet   SIG: take 1 tablet (180 mg) by oral route once daily for 30 days   DISP: (30) Tablet with 5 refills  Prescribed on 05/26/2021     o Medications have been Reconciled  o Transition of Care or Provider Policy  · Instructions  o Take all medications as prescribed/directed.  o Patient was educated/instructed on their diagnosis, treatment and medications prior to discharge from the clinic today.  o Patient instructed to seek medical attention urgently for new or worsening symptoms.  o Call the office with  any concerns or questions.  o Electronically Identified Patient Education Materials Provided Electronically  · Disposition  o Call or Return if symptoms worsen or persist.            Electronically Signed by: RAJNI Warren -Author on June 2, 2021 10:00:54 AM

## 2021-06-09 DIAGNOSIS — K21.9 GASTROESOPHAGEAL REFLUX DISEASE, UNSPECIFIED WHETHER ESOPHAGITIS PRESENT: Primary | ICD-10-CM

## 2021-06-09 RX ORDER — OMEPRAZOLE 40 MG/1
40 CAPSULE, DELAYED RELEASE ORAL DAILY
Qty: 90 CAPSULE | Refills: 1 | Status: SHIPPED | OUTPATIENT
Start: 2021-06-09 | End: 2022-02-21

## 2021-06-09 NOTE — TELEPHONE ENCOUNTER
Pt req rf on Omeprazole 40mg 1 cap po qd #90 1rf    Min Wheeler  
Adequate: hears normal conversation without difficulty

## 2021-07-06 NOTE — PROGRESS NOTES
Progress Note      Patient Name: Melina Styles   Patient ID: [id number]   Sex: Female   YOB: 2000    Primary Care Provider: Mainor Larry PA-C   Referring Provider: Mainor Larry PA-C    Visit Date: November 5, 2020    Provider: Mainor Larry PA-C   Location: [facility]   Location Address: [address] [place] [address], [address]  [place], [place]  [id number]   Location Phone: [phone]          Chief Complaint  · Acid reflux      History Of Present Illness  Video Conferencing Visit  Melina Styles is a 20 year old /White female who is presenting for evaluation via video conferencing via [employer]. Verbal consent obtained before beginning visit.   The following staff were present during this visit: Min Wheeler MA/Mainor Larry PA-C   Melina Styles is a 20 year old /White female who presents for evaluation and treatment of: acid reflux      Pt c/o acid reflux.     Pt states this has been going on since she gave birth in April.     Pt describes symptoms as a burning sensation in her throat.     Pt states she has tried Protonix and it didn't help.     Pt notes EPW prescribed a medication that did work but she ran out of it. Pt does not remember the name of the medication.    Lots of reflux since pregnancy and delivery in March       Past Medical History  Disease Name Date Onset Notes   Allergic rhinitis due to allergen 09/20/2020 --    Asthma --  --    Nicotine dependence 10/31/2018 --    Pap smear for cervical cancer screening 9/2019 NORMAL EPW         Past Surgical History  Procedure Name Date Notes   *Denies any surgical procedures --  --          Medication List  Name Date Started Instructions   Adderall 20 mg oral tablet  take 1 tablet (20 mg) by oral route 2 times per day before breakfast and at noon   Compact Compressor Nebulizer miscellaneous misc 09/21/2020 use as directed for 1 day   ipratropium-albuterol 0.5 mg-3 mg(2.5 mg base)/3 mL inhalation solution for  "nebulization 2020 inhale 3 milliliters by nebulization route 4 times per day and as needed, up to 6 doses per day for 30 days   omeprazole 40 mg oral capsule,delayed release(DR/EC) 2020 take 1 capsule (40 mg) by oral route once daily before a meal for 30 days   Protonix oral  take by oral route daily   Symbicort 80-4.5 mcg/actuation inhalation HFA aerosol inhaler 2020 inhale 2 puffs by inhalation route 2 times per day in the morning and evening   Ventolin HFA 90 mcg/actuation inhalation HFA aerosol inhaler 2020 inhale 1 - 2 puffs (90 - 180 mcg) by inhalation route every 4-6 hours as needed for 30 days         Allergy List  Allergen Name Date Reaction Notes   NO KNOWN DRUG ALLERGIES --  --  --          Family Medical History  Disease Name Relative/Age Notes   Colon Cancer Grandfather (maternal)/   --          Reproductive History  Menstrual   Age Menarche: 10   Pregnancy Summary   Total Pregnancies: 2 Full Term: 2 Premature: 0   Ab Induced: 0 Ab Spontaneous: 0 Ectopics: 0   Multiples: 0 Livin         Social History  Finding Status Start/Stop Quantity Notes   Active but no formal exercise --  --/-- --  --    Alcohol Light --/-- --  2019 -    No known infection risk --  --/-- --  --     --  --/-- --  --    Tobacco Never --/-- --  --    Tobacco use --  --/-- --  smokes socially \"not every day\"         Review of Systems  · Constitutional  o Denies  o : fever, fatigue, weight loss, weight gain  · Cardiovascular  o Denies  o : lower extremity edema, claudication, chest pressure, palpitations  · Respiratory  o Denies  o : shortness of breath, wheezing, cough, hemoptysis, dyspnea on exertion  · Gastrointestinal  o Denies  o : nausea, vomiting, diarrhea, constipation, abdominal pain      Physical Examination  · Constitutional  o Appearance  o : well-nourished, no acute distress  · Head and Face  o Head  o :   § Inspection  § : atraumatic, normocephalic  · Respiratory  o Respiratory " Effort  o : breathing comfortably, no cough  · Skin and Subcutaneous Tissue  o General Inspection  o : no visible rash, no lesions  · Neurologic  o Mental Status Examination  o :   § Orientation  § : grossly oriented to person, place and time, no facial droop          Assessment  · GERD (gastroesophageal reflux disease)     530.81/K21.9  · Acid reflux     530.81/K21.9      Plan  · Orders  o ACO-39: Current medications updated and reviewed (, 1159F) - - 11/05/2020  o Upper GI and small bowel series (02063) - - 11/05/2020  · Medications  o AcipHex 20 mg oral tablet,delayed release (DR/EC)   SIG: take 1 tablet (20 mg) by oral route once daily swallowing whole. Do not crush, chew and/or divide.   DISP: (30) Tablet with 2 refills  Prescribed on 11/05/2020     o Medications have been Reconciled  o Transition of Care or Provider Policy  · Instructions  o Maintain a healthy weight. Avoid tight fitting clothes. Avoid fried, fatty foods, tomato sauce, chocolate, mint, garlic, onion, alcohol. caffeine. Eat smaller meals, dont lie down after a meal, dont smoke. Elevate the head of your bed 6-9 inches.  o Take all medications as prescribed/directed.  o Patient instructed/educated on their diet and exercise program.  o Patient was educated/instructed on their diagnosis, treatment and medications prior to discharge from the clinic today.  o Discussed Covid-19 precautions including, but not limited to, social distancing, avoid touching your face, and hand washing.   o AcipHex was not covered by insurance the patient was switched to omeprazole  · Disposition  o Call or Return if symptoms worsen or persist.  o F/U in clinic in 1 month.  o F/U 2 weeks  o Care Transition            Electronically Signed by: Mainor Larry PA-C -Author on November 6, 2020 06:34:40 AM   Render Risk Assessment In Note?: no

## 2021-07-15 VITALS
BODY MASS INDEX: 21.57 KG/M2 | HEIGHT: 62 IN | DIASTOLIC BLOOD PRESSURE: 64 MMHG | WEIGHT: 117.25 LBS | OXYGEN SATURATION: 100 % | SYSTOLIC BLOOD PRESSURE: 108 MMHG | HEART RATE: 85 BPM

## 2021-07-16 ENCOUNTER — OFFICE VISIT (OUTPATIENT)
Dept: FAMILY MEDICINE CLINIC | Facility: CLINIC | Age: 21
End: 2021-07-16

## 2021-07-16 ENCOUNTER — TELEPHONE (OUTPATIENT)
Dept: FAMILY MEDICINE CLINIC | Facility: CLINIC | Age: 21
End: 2021-07-16

## 2021-07-16 VITALS
WEIGHT: 115.3 LBS | BODY MASS INDEX: 21.22 KG/M2 | TEMPERATURE: 97.8 F | SYSTOLIC BLOOD PRESSURE: 119 MMHG | OXYGEN SATURATION: 99 % | HEIGHT: 62 IN | DIASTOLIC BLOOD PRESSURE: 67 MMHG | HEART RATE: 86 BPM

## 2021-07-16 DIAGNOSIS — J45.20 MILD INTERMITTENT ASTHMA WITHOUT COMPLICATION: Chronic | ICD-10-CM

## 2021-07-16 DIAGNOSIS — J01.00 ACUTE MAXILLARY SINUSITIS, RECURRENCE NOT SPECIFIED: Primary | ICD-10-CM

## 2021-07-16 PROBLEM — J30.9 ALLERGIC RHINITIS DUE TO ALLERGEN: Status: ACTIVE | Noted: 2020-09-20

## 2021-07-16 PROBLEM — R76.8 ANA POSITIVE: Status: ACTIVE | Noted: 2021-07-16

## 2021-07-16 PROBLEM — J45.909 ASTHMA: Status: ACTIVE | Noted: 2021-07-16

## 2021-07-16 PROCEDURE — 99213 OFFICE O/P EST LOW 20 MIN: CPT | Performed by: PHYSICIAN ASSISTANT

## 2021-07-16 RX ORDER — CETIRIZINE HYDROCHLORIDE 10 MG/1
10 TABLET ORAL DAILY
COMMUNITY
End: 2021-08-16

## 2021-07-16 RX ORDER — IPRATROPIUM BROMIDE AND ALBUTEROL SULFATE 2.5; .5 MG/3ML; MG/3ML
3 SOLUTION RESPIRATORY (INHALATION) EVERY 4 HOURS PRN
COMMUNITY
End: 2022-02-21 | Stop reason: SDUPTHER

## 2021-07-16 RX ORDER — BUDESONIDE AND FORMOTEROL FUMARATE DIHYDRATE 80; 4.5 UG/1; UG/1
2 AEROSOL RESPIRATORY (INHALATION) 2 TIMES DAILY
COMMUNITY
End: 2022-01-21 | Stop reason: SDUPTHER

## 2021-07-16 RX ORDER — AMOXICILLIN AND CLAVULANATE POTASSIUM 875; 125 MG/1; MG/1
1 TABLET, FILM COATED ORAL 2 TIMES DAILY
Qty: 20 TABLET | Refills: 0 | Status: SHIPPED | OUTPATIENT
Start: 2021-07-16 | End: 2021-08-16

## 2021-07-16 NOTE — PROGRESS NOTES
Chief Complaint  Chief Complaint   Patient presents with   • Sinusitis     started Monday        Subjective          Melina Styles presents to [facility]  Sinusitis  This is a new problem. The current episode started in the past 7 days. The problem has been gradually worsening since onset. There has been no fever. She is experiencing no pain. Associated symptoms include congestion, coughing, ear pain, headaches, shortness of breath, sinus pressure, sneezing and a sore throat. Past treatments include nothing.   Patient c/o headache, nausea, sorethroat, nasal congestion for about 4-5 days. Mild cough nonproductive. OTC cough drops without relief. No relieving factors. 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    Medical History: has a past medical history of Allergic rhinitis due to allergen (09/20/2020), Asthma, COVID-19 (11/19/2020), Narcolepsy, Nicotine dependence (10/31/2018), Pap smear for cervical cancer screening (09/2019), and Scoliosis.   Surgical History: has no past surgical history on file.   Family History: family history includes Colon cancer in her maternal grandfather.   Social History: reports that she has quit smoking. She smoked 0.25 packs per day. She has never used smokeless tobacco. She reports previous alcohol use. She reports that she does not use drugs.    Allergies: Patient has no known allergies.    Health Maintenance Due   Topic Date Due   • ANNUAL PHYSICAL  Never done   • Pneumococcal Vaccine 0-64 (1 of 1 - PPSV23) Never done   • HPV VACCINES (1 - 2-dose series) Never done   • COVID-19 Vaccine (1) Never done   • HEPATITIS C SCREENING  Never done   • CHLAMYDIA SCREENING  Never done   • PAP SMEAR  Never done       Immunization History   Administered Date(s) Administered   • Tdap 08/16/2016, 03/04/2021       Objective     Vitals:    07/16/21 1109   BP: 119/67   Pulse: 86   Temp: 97.8 °F (36.6 °C)   TempSrc: Temporal   SpO2: 99%  "  Weight: 52.3 kg (115 lb 4.8 oz)   Height: 157.5 cm (62\")     Body mass index is 21.09 kg/m².       Physical Exam  Vitals and nursing note reviewed.   Constitutional:       Appearance: Normal appearance.   HENT:      Head: Normocephalic and atraumatic.      Right Ear: Ear canal normal.      Left Ear: Ear canal normal.      Ears:      Comments: TMs dull bilaterally with serous effusion.     Nose: Congestion present.      Comments: Bilateral maxilary tenderness to palpation.  Neck:      Vascular: No carotid bruit.      Comments: Thyroid : gland size normal, nontender, no nodules or masses present on palpation   Cardiovascular:      Rate and Rhythm: Normal rate and regular rhythm.      Pulses: Normal pulses.      Heart sounds: Normal heart sounds.   Pulmonary:      Effort: Pulmonary effort is normal.      Breath sounds: Normal breath sounds.   Musculoskeletal:      Cervical back: Neck supple. No tenderness.      Right lower leg: No edema.      Left lower leg: No edema.   Lymphadenopathy:      Cervical: No cervical adenopathy.   Neurological:      Mental Status: She is alert.   Psychiatric:         Mood and Affect: Mood normal.         Behavior: Behavior normal.           Result Review :                           Assessment and Plan      Diagnoses and all orders for this visit:    1. Acute maxillary sinusitis, recurrence not specified (Primary)  -     amoxicillin-clavulanate (Augmentin) 875-125 MG per tablet; Take 1 tablet by mouth 2 (Two) Times a Day.  Dispense: 20 tablet; Refill: 0    2. Mild intermittent asthma without complication  Comments:  Stable; continue with current medications            Follow Up     Return if symptoms worsen or fail to improve.    Patient was given instructions and counseling regarding her condition or for health maintenance advice. Please see specific information pulled into the AVS if appropriate.        "

## 2021-07-16 NOTE — PATIENT INSTRUCTIONS
Patient was educated/instructed on their diagnosis, treatment and medications prior to discharge from the clinic today. Patient advised to call the office with any questions or concerns.

## 2021-07-16 NOTE — TELEPHONE ENCOUNTER
Caller: Melina Styles    Relationship to patient: Self    Best call back number: [phone]    Patient is needing: PATIENT CALLED IN AND HAS AN APPOINTMENT TODAY WITH GOLDY POSADAS. SHE WOULD LIKE TO HAVE AN APPOINTMENT WITH JOSELITO IF POSSIBLE. PLEASE CALL PATIENT IF JOSELITO IS AVAILABLE TODAY.

## 2021-08-16 ENCOUNTER — OFFICE VISIT (OUTPATIENT)
Dept: FAMILY MEDICINE CLINIC | Facility: CLINIC | Age: 21
End: 2021-08-16

## 2021-08-16 ENCOUNTER — TELEPHONE (OUTPATIENT)
Dept: FAMILY MEDICINE CLINIC | Facility: CLINIC | Age: 21
End: 2021-08-16

## 2021-08-16 VITALS
DIASTOLIC BLOOD PRESSURE: 64 MMHG | OXYGEN SATURATION: 100 % | RESPIRATION RATE: 18 BRPM | WEIGHT: 115.4 LBS | SYSTOLIC BLOOD PRESSURE: 110 MMHG | BODY MASS INDEX: 21.23 KG/M2 | HEIGHT: 62 IN | HEART RATE: 67 BPM

## 2021-08-16 DIAGNOSIS — Z00.00 ANNUAL PHYSICAL EXAM: Primary | ICD-10-CM

## 2021-08-16 DIAGNOSIS — Z13.220 LIPID SCREENING: ICD-10-CM

## 2021-08-16 DIAGNOSIS — Z13.29 THYROID DISORDER SCREEN: ICD-10-CM

## 2021-08-16 DIAGNOSIS — Z11.1 SCREENING-PULMONARY TB: ICD-10-CM

## 2021-08-16 PROCEDURE — 3008F BODY MASS INDEX DOCD: CPT | Performed by: NURSE PRACTITIONER

## 2021-08-16 PROCEDURE — 2014F MENTAL STATUS ASSESS: CPT | Performed by: NURSE PRACTITIONER

## 2021-08-16 PROCEDURE — 99395 PREV VISIT EST AGE 18-39: CPT | Performed by: NURSE PRACTITIONER

## 2021-08-16 NOTE — TELEPHONE ENCOUNTER
Caller: Melina Styles    Relationship: Self    Best call back number: [phone]    What medication are you requesting: NAUSEA MEDICATION    What are your current symptoms: NAUSEA      If a prescription is needed, what is your preferred pharmacy and phone number: [id number] - [place], [place] - [address]  AT [facility] [phone] [facility] [phone]      Additional notes:  PATIENT CALLED IN AND SAID SHE HAD BEEN SEEN TODAY AND WOULD LIKE SOME MEDICATION PRESCRIBED FOR NAUSEA PLEASE.

## 2021-08-16 NOTE — PROGRESS NOTES
"Chief Complaint  Annual Exam    Subjective          Melina Styles presents to [facility] for   History of Present Illness    Patient is here for an annual physical. 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  has been working in long term care since 2017. Pt states has TB skin test approx 8 weeks ago, but states she would be unable to produce proof of this.. States she has positive antibodies in the past but always has negative CXR. Denies any cough, fever, SOB, night sweats.  Patient denies any known history of TB but states \"I do not really know, I have been in the hospital with a lot of lung things in the past\"  Medical History  Past Medical History:   Diagnosis Date   • Allergic rhinitis due to allergen 09/20/2020   • Asthma     uses inhaler about once a week   • COVID-19 11/19/2020   • Narcolepsy    • Nicotine dependence 10/31/2018   • Pap smear for cervical cancer screening 09/2019    NORMAL EPW   • Scoliosis      Surgical History  History reviewed. No pertinent surgical history.  Social History  Social History     Socioeconomic History   • Marital status: Other     Spouse name: Not on file   • Number of children: Not on file   • Years of education: Not on file   • Highest education level: Not on file   Tobacco Use   • Smoking status: Former Smoker     Packs/day: 0.25   • Smokeless tobacco: Never Used   • Tobacco comment: smokes socially \"not everyday\"   Vaping Use   • Vaping Use: Never used   Substance and Sexual Activity   • Alcohol use: Not Currently     Comment: occationally , light, 11/13/2019   • Drug use: No   • Sexual activity: Yes     Birth control/protection: None       Current Outpatient Medications:   •  albuterol sulfate  (90 Base) MCG/ACT inhaler, Inhale 2 puffs Every 4 (Four) Hours As Needed for Wheezing., Disp: 18 g, Rfl: 0  •  budesonide-formoterol (Symbicort) 80-4.5 MCG/ACT inhaler, " "Inhale 2 puffs 2 (two) times a day., Disp: , Rfl:   •  ipratropium-albuterol (DUO-NEB) 0.5-2.5 mg/3 ml nebulizer, Take 3 mL by nebulization Every 4 (Four) Hours As Needed., Disp: , Rfl:   •  omeprazole (priLOSEC) 40 MG capsule, Take 1 capsule by mouth Daily., Disp: 90 capsule, Rfl: 1    Review of Systems     Objective     /64 (BP Location: Left arm, Patient Position: Sitting, Cuff Size: Adult)   Pulse 67   Resp 18   Ht 157.5 cm (62\")   Wt 52.3 kg (115 lb 6.4 oz)   SpO2 100%   BMI 21.11 kg/m²     Body mass index is 21.11 kg/m².    Physical Exam  Vitals reviewed.   Constitutional:       General: She is not in acute distress.     Appearance: Normal appearance. She is well-developed. She is not diaphoretic.   HENT:      Head: Normocephalic and atraumatic. Hair is normal.      Right Ear: Hearing, tympanic membrane, ear canal and external ear normal. No decreased hearing noted. No drainage.      Left Ear: Hearing, tympanic membrane, ear canal and external ear normal. No decreased hearing noted.      Nose: Nose normal. No nasal deformity.      Mouth/Throat:      Mouth: Mucous membranes are moist.      Pharynx: No oropharyngeal exudate.   Eyes:      General: Lids are normal.         Right eye: No discharge.         Left eye: No discharge.      Extraocular Movements: Extraocular movements intact.      Conjunctiva/sclera: Conjunctivae normal.      Pupils: Pupils are equal, round, and reactive to light.   Neck:      Thyroid: No thyromegaly.      Vascular: No JVD.   Cardiovascular:      Rate and Rhythm: Normal rate and regular rhythm.      Pulses: Normal pulses.      Heart sounds: Normal heart sounds. No murmur heard.   No friction rub. No gallop.    Pulmonary:      Effort: Pulmonary effort is normal. No respiratory distress.      Breath sounds: Normal breath sounds. No wheezing, rhonchi or rales.   Chest:      Chest wall: No tenderness.   Abdominal:      General: Bowel sounds are normal. There is no distension.     "  Palpations: Abdomen is soft. There is no mass.      Tenderness: There is no abdominal tenderness. There is no guarding or rebound.      Hernia: No hernia is present.   Musculoskeletal:         General: No tenderness or deformity. Normal range of motion.      Cervical back: Normal range of motion and neck supple.   Lymphadenopathy:      Cervical: No cervical adenopathy.   Skin:     General: Skin is warm and dry.      Findings: No erythema or rash.   Neurological:      Mental Status: She is alert and oriented to person, place, and time.      Cranial Nerves: No cranial nerve deficit.      Motor: No abnormal muscle tone.      Coordination: Coordination normal.      Deep Tendon Reflexes: Reflexes are normal and symmetric. Reflexes normal.   Psychiatric:         Mood and Affect: Mood and affect normal.         Behavior: Behavior normal.         Thought Content: Thought content normal.         Judgment: Judgment normal.         Result Review :     The following data was reviewed by: RAJNI Sher on 08/16/2021:                         Assessment:  Diagnoses and all orders for this visit:    1. Annual physical exam (Primary)  -     Comprehensive Metabolic Panel  -     CBC & Differential  -     TSH  -     Lipid Panel  -     QuantiFERON TB Gold; Future    2. Lipid screening  -     Lipid Panel    3. Thyroid disorder screen  -     TSH    4. Screening-pulmonary TB  -     QuantiFERON TB Gold; Future        Plan:   Labs as above, patient has physical form to be completed from her employer however it appears to be only part of the form, it states that the patient is free of any communicable diseases and is able to physically do the demands of the job however it does not have a listed a job description.  Informed patient that she will need to bring the rest of the form so that I may complete it.          Follow Up     No follow-ups on file.    Patient was given instructions and counseling regarding her condition or for  health maintenance advice. Please see specific information pulled into the AVS if appropriate.     Dori Bourgeois, RAJNI  08/16/2021

## 2021-08-16 NOTE — TELEPHONE ENCOUNTER
KD told patient during visit that she wouldn't prescribe nausea medication due to patient having no symptoms correlating with nausea. Patient just wanted it in case, patient is not KD's patient.

## 2021-08-18 ENCOUNTER — LAB (OUTPATIENT)
Dept: LAB | Facility: HOSPITAL | Age: 21
End: 2021-08-18

## 2021-08-18 ENCOUNTER — TELEPHONE (OUTPATIENT)
Dept: FAMILY MEDICINE CLINIC | Facility: CLINIC | Age: 21
End: 2021-08-18

## 2021-08-18 DIAGNOSIS — Z11.1 SCREENING-PULMONARY TB: ICD-10-CM

## 2021-08-18 DIAGNOSIS — Z00.00 ANNUAL PHYSICAL EXAM: ICD-10-CM

## 2021-08-18 LAB
ALBUMIN SERPL-MCNC: 4.6 G/DL (ref 3.5–5.2)
ALBUMIN/GLOB SERPL: 2.1 G/DL
ALP SERPL-CCNC: 35 U/L (ref 39–117)
ALT SERPL W P-5'-P-CCNC: 9 U/L (ref 1–33)
ANION GAP SERPL CALCULATED.3IONS-SCNC: 7.7 MMOL/L (ref 5–15)
AST SERPL-CCNC: 8 U/L (ref 1–32)
BASOPHILS # BLD AUTO: 0.04 10*3/MM3 (ref 0–0.2)
BASOPHILS NFR BLD AUTO: 0.8 % (ref 0–1.5)
BILIRUB SERPL-MCNC: 1.1 MG/DL (ref 0–1.2)
BUN SERPL-MCNC: 7 MG/DL (ref 6–20)
BUN/CREAT SERPL: 9.3 (ref 7–25)
CALCIUM SPEC-SCNC: 8.9 MG/DL (ref 8.6–10.5)
CHLORIDE SERPL-SCNC: 107 MMOL/L (ref 98–107)
CHOLEST SERPL-MCNC: 113 MG/DL (ref 0–200)
CO2 SERPL-SCNC: 23.3 MMOL/L (ref 22–29)
CREAT SERPL-MCNC: 0.75 MG/DL (ref 0.57–1)
DEPRECATED RDW RBC AUTO: 39.7 FL (ref 37–54)
EOSINOPHIL # BLD AUTO: 0.08 10*3/MM3 (ref 0–0.4)
EOSINOPHIL NFR BLD AUTO: 1.6 % (ref 0.3–6.2)
ERYTHROCYTE [DISTWIDTH] IN BLOOD BY AUTOMATED COUNT: 12.2 % (ref 12.3–15.4)
GFR SERPL CREATININE-BSD FRML MDRD: 96 ML/MIN/1.73
GLOBULIN UR ELPH-MCNC: 2.2 GM/DL
GLUCOSE SERPL-MCNC: 77 MG/DL (ref 65–99)
HCT VFR BLD AUTO: 35.6 % (ref 34–46.6)
HDLC SERPL-MCNC: 46 MG/DL (ref 40–60)
HGB BLD-MCNC: 12.2 G/DL (ref 12–15.9)
IMM GRANULOCYTES # BLD AUTO: 0.02 10*3/MM3 (ref 0–0.05)
IMM GRANULOCYTES NFR BLD AUTO: 0.4 % (ref 0–0.5)
LDLC SERPL CALC-MCNC: 57 MG/DL (ref 0–100)
LDLC/HDLC SERPL: 1.29 {RATIO}
LYMPHOCYTES # BLD AUTO: 1.48 10*3/MM3 (ref 0.7–3.1)
LYMPHOCYTES NFR BLD AUTO: 28.9 % (ref 19.6–45.3)
MCH RBC QN AUTO: 31 PG (ref 26.6–33)
MCHC RBC AUTO-ENTMCNC: 34.3 G/DL (ref 31.5–35.7)
MCV RBC AUTO: 90.6 FL (ref 79–97)
MONOCYTES # BLD AUTO: 0.4 10*3/MM3 (ref 0.1–0.9)
MONOCYTES NFR BLD AUTO: 7.8 % (ref 5–12)
NEUTROPHILS NFR BLD AUTO: 3.1 10*3/MM3 (ref 1.7–7)
NEUTROPHILS NFR BLD AUTO: 60.5 % (ref 42.7–76)
NRBC BLD AUTO-RTO: 0 /100 WBC (ref 0–0.2)
PLATELET # BLD AUTO: 208 10*3/MM3 (ref 140–450)
PMV BLD AUTO: 10.9 FL (ref 6–12)
POTASSIUM SERPL-SCNC: 4 MMOL/L (ref 3.5–5.2)
PROT SERPL-MCNC: 6.8 G/DL (ref 6–8.5)
RBC # BLD AUTO: 3.93 10*6/MM3 (ref 3.77–5.28)
SODIUM SERPL-SCNC: 138 MMOL/L (ref 136–145)
TRIGL SERPL-MCNC: 39 MG/DL (ref 0–150)
TSH SERPL DL<=0.05 MIU/L-ACNC: 1.22 UIU/ML (ref 0.27–4.2)
VLDLC SERPL-MCNC: 10 MG/DL (ref 5–40)
WBC # BLD AUTO: 5.12 10*3/MM3 (ref 3.4–10.8)

## 2021-08-18 PROCEDURE — 80061 LIPID PANEL: CPT | Performed by: NURSE PRACTITIONER

## 2021-08-18 PROCEDURE — 84443 ASSAY THYROID STIM HORMONE: CPT | Performed by: NURSE PRACTITIONER

## 2021-08-18 PROCEDURE — 85025 COMPLETE CBC W/AUTO DIFF WBC: CPT | Performed by: NURSE PRACTITIONER

## 2021-08-18 PROCEDURE — 36415 COLL VENOUS BLD VENIPUNCTURE: CPT | Performed by: NURSE PRACTITIONER

## 2021-08-18 PROCEDURE — 86480 TB TEST CELL IMMUN MEASURE: CPT

## 2021-08-18 PROCEDURE — 80053 COMPREHEN METABOLIC PANEL: CPT | Performed by: NURSE PRACTITIONER

## 2021-08-18 NOTE — TELEPHONE ENCOUNTER
Caller: Melina Styles    Relationship: Self    Best call back number: [phone]    What is the best time to reach you: ANYTIME IF NEEDED     Who are you requesting to speak with (clinical staff, provider,  specific staff member): MERCEDES MCNAMARA IS WHO SHE SAW LAST.     What was the call regarding: PATIENT IS WAITING ON PHYSICAL FORM TO BE FILLED OUT, ONCE FORM IS COMPLETE PLEASE FAX TO:  [phone] - ATTN:  RAEGAN CURTIS    Do you require a callback: YES

## 2021-08-19 ENCOUNTER — TELEPHONE (OUTPATIENT)
Dept: FAMILY MEDICINE CLINIC | Facility: CLINIC | Age: 21
End: 2021-08-19

## 2021-08-23 ENCOUNTER — TELEPHONE (OUTPATIENT)
Dept: FAMILY MEDICINE CLINIC | Facility: CLINIC | Age: 21
End: 2021-08-23

## 2021-08-23 NOTE — TELEPHONE ENCOUNTER
Caller: Melina Styles    Relationship: Self    Best call back number: [phone]    Caller requesting test results: YES    What test was performed: TB SKIN TEST    When was the test performed: Wednesday     Where was the test performed:[facility] IN [place]

## 2021-08-25 ENCOUNTER — TELEPHONE (OUTPATIENT)
Dept: FAMILY MEDICINE CLINIC | Facility: CLINIC | Age: 21
End: 2021-08-25

## 2021-08-25 DIAGNOSIS — R76.12 POSITIVE QUANTIFERON-TB GOLD TEST: Primary | ICD-10-CM

## 2021-08-25 LAB
GAMMA INTERFERON BACKGROUND BLD IA-ACNC: 0.04 IU/ML
M TB IFN-G BLD-IMP: POSITIVE
M TB IFN-G CD4+ BCKGRND COR BLD-ACNC: 0.8 IU/ML
M TB IFN-G CD4+CD8+ BCKGRND COR BLD-ACNC: 0.76 IU/ML
MITOGEN IGNF BLD-ACNC: >10 IU/ML
QUANTIFERON INCUBATION: ABNORMAL
SERVICE CMNT-IMP: ABNORMAL

## 2021-09-22 ENCOUNTER — TELEMEDICINE (OUTPATIENT)
Dept: FAMILY MEDICINE CLINIC | Facility: CLINIC | Age: 21
End: 2021-09-22

## 2021-09-22 DIAGNOSIS — R11.0 NAUSEA: ICD-10-CM

## 2021-09-22 DIAGNOSIS — M54.9 BACK PAIN, UNSPECIFIED BACK LOCATION, UNSPECIFIED BACK PAIN LATERALITY, UNSPECIFIED CHRONICITY: Primary | ICD-10-CM

## 2021-09-22 PROCEDURE — 99213 OFFICE O/P EST LOW 20 MIN: CPT | Performed by: NURSE PRACTITIONER

## 2021-09-22 RX ORDER — ONDANSETRON HYDROCHLORIDE 8 MG/1
8 TABLET, FILM COATED ORAL EVERY 8 HOURS PRN
Qty: 24 TABLET | Refills: 0 | Status: SHIPPED | OUTPATIENT
Start: 2021-09-22 | End: 2022-02-21 | Stop reason: ALTCHOICE

## 2021-09-22 NOTE — PROGRESS NOTES
"You have chosen to receive care through a telehealth visit.  Do you consent to use a video/audio connection for your medical care today? YES     Chief Complaint  Back Pain, Jaw Pain, and Nausea    Subjective            Melina Styles presents to [facility]  Patient is  whole back pain, and nausea since getting her second covid vaccine. It started 12 hours after she got her shot and she is been taking Tylenol for relief it seems to be helping some.        PMH  Past Medical History:   Diagnosis Date   • Allergic rhinitis due to allergen 09/20/2020   • Asthma     uses inhaler about once a week   • COVID-19 11/19/2020   • Narcolepsy    • Nicotine dependence 10/31/2018   • Pap smear for cervical cancer screening 09/2019    NORMAL EPW   • Scoliosis        ALLERGY  Allergies   Allergen Reactions   • Latex Hives        SURGICALHX  History reviewed. No pertinent surgical history.     SOCX  Social History     Tobacco Use   • Smoking status: Former Smoker     Packs/day: 0.25   • Smokeless tobacco: Never Used   • Tobacco comment: smokes socially \"not everyday\"   Substance Use Topics   • Alcohol use: Not Currently     Comment: occationally , light, 11/13/2019       FAMHX  Family History   Problem Relation Age of Onset   • Colon cancer Maternal Grandfather         MEDSIGONLY  Current Outpatient Medications on File Prior to Visit   Medication Sig   • albuterol sulfate  (90 Base) MCG/ACT inhaler Inhale 2 puffs Every 4 (Four) Hours As Needed for Wheezing.   • budesonide-formoterol (Symbicort) 80-4.5 MCG/ACT inhaler Inhale 2 puffs 2 (two) times a day.   • ipratropium-albuterol (DUO-NEB) 0.5-2.5 mg/3 ml nebulizer Take 3 mL by nebulization Every 4 (Four) Hours As Needed.   • omeprazole (priLOSEC) 40 MG capsule Take 1 capsule by mouth Daily.     No current facility-administered medications on file prior to visit.       Health Maintenance Due   Topic Date Due   • Pneumococcal Vaccine 0-64 (1 of 2 - " PPSV23) Never done   • HPV VACCINES (1 - 2-dose series) Never done   • COVID-19 Vaccine (1) Never done   • HEPATITIS C SCREENING  Never done   • CHLAMYDIA SCREENING  Never done   • PAP SMEAR  Never done       Objective     There were no vitals taken for this visit.      Physical Exam  Psychiatric:         Attention and Perception: Attention normal.         Mood and Affect: Mood normal.         Speech: Speech normal.         Behavior: Behavior normal. Behavior is cooperative.         Thought Content: Thought content normal.         Cognition and Memory: Cognition normal.         Judgment: Judgment normal.           Result Review :                           Assessment and Plan        Diagnoses and all orders for this visit:    1. Back pain, unspecified back location, unspecified back pain laterality, unspecified chronicity (Primary)  Comments:  continue Tylenol prn    2. Nausea  -     ondansetron (Zofran) 8 MG tablet; Take 1 tablet by mouth Every 8 (Eight) Hours As Needed for Nausea or Vomiting.  Dispense: 24 tablet; Refill: 0      Advised if symptoms increased or worsened go to ER to for evaluation.        Follow Up     Return if symptoms worsen or fail to improve.    Patient was given instructions and counseling regarding her condition or for health maintenance advice. Please see specific information pulled into the AVS if appropriate.         Melina Rivas, APRN

## 2021-12-07 RX ORDER — ALBUTEROL SULFATE 90 UG/1
AEROSOL, METERED RESPIRATORY (INHALATION)
Qty: 18 G | Refills: 0 | OUTPATIENT
Start: 2021-12-07

## 2021-12-07 NOTE — TELEPHONE ENCOUNTER
DENIED REFILL ON INHALER .PATIENT SHOULD REFILL FROM PREVIOUS DR OR CONTACT PCP.  NO RECORD OF PT BEING PREG @ THIS TIME

## 2021-12-13 ENCOUNTER — TELEPHONE (OUTPATIENT)
Dept: FAMILY MEDICINE CLINIC | Facility: CLINIC | Age: 21
End: 2021-12-13

## 2021-12-13 NOTE — TELEPHONE ENCOUNTER
Phoned the patient she states she did not get the test done she did not feel she needed it. She stated for us to cancel that order.

## 2022-01-01 NOTE — DISCHARGE INSTRUCTIONS
Patient Education    BRIGHT [employer] HANDOUT- PARENT  2 MONTH VISIT  Here are some suggestions from [employer] experts that may be of value to your family.     HOW YOUR FAMILY IS DOING  If you are worried about your living or food situation, talk with us. Community agencies and programs such as WIC and SNAP can also provide information and assistance.  Find ways to spend time with your partner. Keep in touch with family and friends.  Find safe, loving  for your baby. You can ask us for help.  Know that it is normal to feel sad about leaving your baby with a caregiver or putting him into .    FEEDING YOUR BABY    Feed your baby only breast milk or iron-fortified formula until she is about 6 months old.    Avoid feeding your baby solid foods, juice, and water until she is about 6 months old.    Feed your baby when you see signs of hunger. Look for her to    Put her hand to her mouth.    Suck, root, and fuss.    Stop feeding when you see signs your baby is full. You can tell when she    Turns away    Closes her mouth    Relaxes her arms and hands    Burp your baby during natural feeding breaks.  If Breastfeeding    Feed your baby on demand. Expect to breastfeed 8 to 12 times in 24 hours.    Give your baby vitamin D drops (400 IU a day).    Continue to take your prenatal vitamin with iron.    Eat a healthy diet.    Plan for pumping and storing breast milk. Let us know if you need help.    If you pump, be sure to store your milk properly so it stays safe for your baby. If you have questions, ask us.  If Formula Feeding  Feed your baby on demand. Expect her to eat about 6 to 8 times each day, or 26 to 28 oz of formula per day.  Make sure to prepare, heat, and store the formula safely. If you need help, ask us.  Hold your baby so you can look at each other when you feed her.  Always hold the bottle. Never prop it.    HOW YOU ARE FEELING    Take care of yourself so you have the energy to care for  Take medication as prescribed.  Return to the emergency department for persistent vomiting, abdominal pain, fever, or other concern.  Follow-up with your primary care provider if symptoms persist.   your baby.    Talk with me or call for help if you feel sad or very tired for more than a few days.    Find small but safe ways for your other children to help with the baby, such as bringing you things you need or holding the baby s hand.    Spend special time with each child reading, talking, and doing things together.    YOUR GROWING BABY    Have simple routines each day for bathing, feeding, sleeping, and playing.    Hold, talk to, cuddle, read to, sing to, and play often with your baby. This helps you connect with and relate to your baby.    Learn what your baby does and does not like.    Develop a schedule for naps and bedtime. Put him to bed awake but drowsy so he learns to fall asleep on his own.    Don t have a TV on in the background or use a TV or other digital media to calm your baby.    Put your baby on his tummy for short periods of playtime. Don t leave him alone during tummy time or allow him to sleep on his tummy.    Notice what helps calm your baby, such as a pacifier, his fingers, or his thumb. Stroking, talking, rocking, or going for walks may also work.    Never hit or shake your baby.    SAFETY    Use a rear-facing-only car safety seat in the back seat of all vehicles.    Never put your baby in the front seat of a vehicle that has a passenger airbag.    Your baby s safety depends on you. Always wear your lap and shoulder seat belt. Never drive after drinking alcohol or using drugs. Never text or use a cell phone while driving.    Always put your baby to sleep on her back in her own crib, not your bed.    Your baby should sleep in your room until she is at least 6 months old.    Make sure your baby s crib or sleep surface meets the most recent safety guidelines.    If you choose to use a mesh playpen, get one made after February 28, 2013.    Swaddling should not be used after 2 months of age.    Prevent scalds or burns. Don t drink hot liquids while holding your baby.    Prevent tap water burns.  Set the water heater so the temperature at the faucet is at or below 120 F /49 C.    Keep a hand on your baby when dressing or changing her on a changing table, couch, or bed.    Never leave your baby alone in bathwater, even in a bath seat or ring.    WHAT TO EXPECT AT YOUR BABY S 4 MONTH VISIT  We will talk about  Caring for your baby, your family, and yourself  Creating routines and spending time with your baby  Keeping teeth healthy  Feeding your baby  Keeping your baby safe at home and in the car          Helpful Resources:  Information About Car Safety Seats: www.safercar.gov/parents  Toll-free Auto Safety Hotline: 275.799.6816  Consistent with Bright Futures: Guidelines for Health Supervision of Infants, Children, and Adolescents, 4th Edition  For more information, go to https://brightfutures.aap.org.           Patient Education    BRIGHT [employer] HANDOUT- PARENT  2 MONTH VISIT  Here are some suggestions from [employer] experts that may be of value to your family.     HOW YOUR FAMILY IS DOING  If you are worried about your living or food situation, talk with us. Community agencies and programs such as WIC and SNAP can also provide information and assistance.  Find ways to spend time with your partner. Keep in touch with family and friends.  Find safe, loving  for your baby. You can ask us for help.  Know that it is normal to feel sad about leaving your baby with a caregiver or putting him into .    FEEDING YOUR BABY    Feed your baby only breast milk or iron-fortified formula until she is about 6 months old.    Avoid feeding your baby solid foods, juice, and water until she is about 6 months old.    Feed your baby when you see signs of hunger. Look for her to    Put her hand to her mouth.    Suck, root, and fuss.    Stop feeding when you see signs your baby is full. You can tell when she    Turns away    Closes her mouth    Relaxes her arms and hands    Burp your baby during natural  feeding breaks.  If Breastfeeding    Feed your baby on demand. Expect to breastfeed 8 to 12 times in 24 hours.    Give your baby vitamin D drops (400 IU a day).    Continue to take your prenatal vitamin with iron.    Eat a healthy diet.    Plan for pumping and storing breast milk. Let us know if you need help.    If you pump, be sure to store your milk properly so it stays safe for your baby. If you have questions, ask us.  If Formula Feeding  Feed your baby on demand. Expect her to eat about 6 to 8 times each day, or 26 to 28 oz of formula per day.  Make sure to prepare, heat, and store the formula safely. If you need help, ask us.  Hold your baby so you can look at each other when you feed her.  Always hold the bottle. Never prop it.    HOW YOU ARE FEELING    Take care of yourself so you have the energy to care for your baby.    Talk with me or call for help if you feel sad or very tired for more than a few days.    Find small but safe ways for your other children to help with the baby, such as bringing you things you need or holding the baby s hand.    Spend special time with each child reading, talking, and doing things together.    YOUR GROWING BABY    Have simple routines each day for bathing, feeding, sleeping, and playing.    Hold, talk to, cuddle, read to, sing to, and play often with your baby. This helps you connect with and relate to your baby.    Learn what your baby does and does not like.    Develop a schedule for naps and bedtime. Put him to bed awake but drowsy so he learns to fall asleep on his own.    Don t have a TV on in the background or use a TV or other digital media to calm your baby.    Put your baby on his tummy for short periods of playtime. Don t leave him alone during tummy time or allow him to sleep on his tummy.    Notice what helps calm your baby, such as a pacifier, his fingers, or his thumb. Stroking, talking, rocking, or going for walks may also work.    Never hit or shake your  baby.    SAFETY    Use a rear-facing-only car safety seat in the back seat of all vehicles.    Never put your baby in the front seat of a vehicle that has a passenger airbag.    Your baby s safety depends on you. Always wear your lap and shoulder seat belt. Never drive after drinking alcohol or using drugs. Never text or use a cell phone while driving.    Always put your baby to sleep on her back in her own crib, not your bed.    Your baby should sleep in your room until she is at least 6 months old.    Make sure your baby s crib or sleep surface meets the most recent safety guidelines.    If you choose to use a mesh playpen, get one made after February 28, 2013.    Swaddling should not be used after 2 months of age.    Prevent scalds or burns. Don t drink hot liquids while holding your baby.    Prevent tap water burns. Set the water heater so the temperature at the faucet is at or below 120 F /49 C.    Keep a hand on your baby when dressing or changing her on a changing table, couch, or bed.    Never leave your baby alone in bathwater, even in a bath seat or ring.    WHAT TO EXPECT AT YOUR BABY S 4 MONTH VISIT  We will talk about  Caring for your baby, your family, and yourself  Creating routines and spending time with your baby  Keeping teeth healthy  Feeding your baby  Keeping your baby safe at home and in the car          Helpful Resources:  Information About Car Safety Seats: www.safercar.gov/parents  Toll-free Auto Safety Hotline: 265.394.2566  Consistent with Bright Futures: Guidelines for Health Supervision of Infants, Children, and Adolescents, 4th Edition  For more information, go to https://brightfutures.aap.org.

## 2022-01-21 DIAGNOSIS — J45.909 ASTHMA AFFECTING PREGNANCY IN THIRD TRIMESTER: Primary | ICD-10-CM

## 2022-01-21 DIAGNOSIS — O99.513 ASTHMA AFFECTING PREGNANCY IN THIRD TRIMESTER: Primary | ICD-10-CM

## 2022-01-21 RX ORDER — BUDESONIDE AND FORMOTEROL FUMARATE DIHYDRATE 80; 4.5 UG/1; UG/1
2 AEROSOL RESPIRATORY (INHALATION) EVERY 4 HOURS PRN
Qty: 1 EACH | Refills: 0 | Status: SHIPPED | OUTPATIENT
Start: 2022-01-21 | End: 2022-02-21 | Stop reason: SDUPTHER

## 2022-01-21 RX ORDER — ALBUTEROL SULFATE 90 UG/1
2 AEROSOL, METERED RESPIRATORY (INHALATION) EVERY 4 HOURS PRN
Qty: 18 G | Refills: 0 | Status: SHIPPED | OUTPATIENT
Start: 2022-01-21 | End: 2022-02-21 | Stop reason: SDUPTHER

## 2022-01-21 NOTE — TELEPHONE ENCOUNTER
Caller: Melina Styles    Relationship: Self    Best call back number:[phone]    Requested Prescriptions:   Requested Prescriptions     Pending Prescriptions Disp Refills   • albuterol sulfate  (90 Base) MCG/ACT inhaler 18 g 0     Sig: Inhale 2 puffs Every 4 (Four) Hours As Needed for Wheezing.   • budesonide-formoterol (Symbicort) 80-4.5 MCG/ACT inhaler       Sig: Inhale 2 puffs.      Pharmacy where request should be sent: [facility] #[id number] [id number] AT [facility] - [phone]  - [phone] FX     Does the patient have less than a 3 day supply:  [] Yes  [x] No    Massimo Oviedo Rep   01/21/22 12:11 EST

## 2022-02-21 ENCOUNTER — OFFICE VISIT (OUTPATIENT)
Dept: FAMILY MEDICINE CLINIC | Facility: CLINIC | Age: 22
End: 2022-02-21

## 2022-02-21 VITALS
HEART RATE: 80 BPM | DIASTOLIC BLOOD PRESSURE: 60 MMHG | SYSTOLIC BLOOD PRESSURE: 102 MMHG | TEMPERATURE: 97.3 F | OXYGEN SATURATION: 99 % | RESPIRATION RATE: 20 BRPM | HEIGHT: 62 IN | BODY MASS INDEX: 22.08 KG/M2 | WEIGHT: 120 LBS

## 2022-02-21 DIAGNOSIS — R11.0 NAUSEA: Primary | ICD-10-CM

## 2022-02-21 DIAGNOSIS — M21.611 BILATERAL BUNIONS: ICD-10-CM

## 2022-02-21 DIAGNOSIS — J45.20 INTERMITTENT ASTHMA WITHOUT COMPLICATION, UNSPECIFIED ASTHMA SEVERITY: ICD-10-CM

## 2022-02-21 DIAGNOSIS — M21.612 BILATERAL BUNIONS: ICD-10-CM

## 2022-02-21 DIAGNOSIS — D64.9 ANEMIA, UNSPECIFIED TYPE: ICD-10-CM

## 2022-02-21 PROBLEM — J45.909 ASTHMA AFFECTING PREGNANCY IN THIRD TRIMESTER: Status: RESOLVED | Noted: 2020-04-19 | Resolved: 2022-02-21

## 2022-02-21 PROBLEM — O99.513 ASTHMA AFFECTING PREGNANCY IN THIRD TRIMESTER: Status: RESOLVED | Noted: 2020-04-19 | Resolved: 2022-02-21

## 2022-02-21 PROCEDURE — 99214 OFFICE O/P EST MOD 30 MIN: CPT | Performed by: FAMILY MEDICINE

## 2022-02-21 RX ORDER — ALBUTEROL SULFATE 90 UG/1
2 AEROSOL, METERED RESPIRATORY (INHALATION) EVERY 4 HOURS PRN
Qty: 18 G | Refills: 5 | Status: SHIPPED | OUTPATIENT
Start: 2022-02-21 | End: 2022-02-21 | Stop reason: SDUPTHER

## 2022-02-21 RX ORDER — PROMETHAZINE HYDROCHLORIDE 25 MG/1
25 TABLET ORAL EVERY 6 HOURS PRN
Qty: 30 TABLET | Refills: 2 | Status: SHIPPED | OUTPATIENT
Start: 2022-02-21

## 2022-02-21 RX ORDER — BUDESONIDE AND FORMOTEROL FUMARATE DIHYDRATE 80; 4.5 UG/1; UG/1
2 AEROSOL RESPIRATORY (INHALATION) EVERY 4 HOURS PRN
Qty: 1 EACH | Refills: 5 | Status: SHIPPED | OUTPATIENT
Start: 2022-02-21

## 2022-02-21 RX ORDER — ALBUTEROL SULFATE 90 UG/1
2 AEROSOL, METERED RESPIRATORY (INHALATION) EVERY 4 HOURS PRN
Qty: 18 G | Refills: 5 | Status: SHIPPED | OUTPATIENT
Start: 2022-02-21

## 2022-02-21 RX ORDER — PNV NO.95/FERROUS FUM/FOLIC AC 28MG-0.8MG
1 TABLET ORAL DAILY
Qty: 30 TABLET | Refills: 5 | Status: SHIPPED | OUTPATIENT
Start: 2022-02-21

## 2022-02-21 RX ORDER — IPRATROPIUM BROMIDE AND ALBUTEROL SULFATE 2.5; .5 MG/3ML; MG/3ML
3 SOLUTION RESPIRATORY (INHALATION) EVERY 4 HOURS PRN
Qty: 360 ML | Refills: 2 | Status: SHIPPED | OUTPATIENT
Start: 2022-02-21

## 2022-02-21 NOTE — PROGRESS NOTES
HPI  Melina Styles is a 22 y.o. female who is here to establish new primary care physician.  Moved here from out of town and has not established new physician.  Has been on medication for asthma in the past and needs refill on rescue inhalers as well as duo nebs and Symbicort.  Also reports fear of nausea and requesting refill on Phenergan.  Has made appointment with allergist regarding asthma and tests etc.  Apparently followed by pulmonologist at previous location.  Apparently especially during pregnancy.  Does report some cigarette use which obviously is discouraged.  Has tested positive for TB blood test but apparently has negative skin testing.  At some point had a positive NORMAN but denies arthritic complaints except for painful bilateral bunions and recommend podiatry referral.  Other health history reviewed.  There are some records available apparently from previous obstetrician.  Concerned about vitamin deficiency and apparently is trying to get pregnant and do recommend taking prenatal vitamins in anticipation.      Review of Systems   Respiratory: Positive for shortness of breath and wheezing.    Gastrointestinal: Positive for nausea. Negative for vomiting.   All other systems reviewed and are negative.        Past Medical History:   Diagnosis Date   • Allergic rhinitis due to allergen 09/20/2020   • Asthma     uses inhaler about once a week   • Asthma affecting pregnancy in third trimester 4/19/2020   • COVID-19 11/19/2020   • Narcolepsy    • Nicotine dependence 10/31/2018   • Pap smear for cervical cancer screening 09/2019    NORMAL EPW   • Scoliosis        Past Surgical History:   Procedure Laterality Date   • WISDOM TOOTH EXTRACTION         Family History   Problem Relation Age of Onset   • Colon cancer Maternal Grandfather        Social History     Socioeconomic History   • Marital status: Other   Tobacco Use   • Smoking status: Former Smoker     Packs/day: 0.25   • Smokeless tobacco: Never Used   •  "Tobacco comment: smokes socially \"not everyday\"   Vaping Use   • Vaping Use: Never used   Substance and Sexual Activity   • Alcohol use: Not Currently     Comment: occationally , light, 11/13/2019   • Drug use: No   • Sexual activity: Yes     Birth control/protection: None       Vitals:    02/21/22 0949   BP: 102/60   Pulse: 80   Resp: 20   Temp: 97.3 °F (36.3 °C)   SpO2: 99%        Body mass index is 21.95 kg/m².      Physical Exam  Vitals and nursing note reviewed.   Constitutional:       General: She is not in acute distress.     Appearance: She is well-developed.   HENT:      Head: Normocephalic and atraumatic.      Nose:      Comments: Patient and daughter both with masks.  Provider with mask and shield  Eyes:      Extraocular Movements: Extraocular movements intact.      Conjunctiva/sclera: Conjunctivae normal.      Pupils: Pupils are equal, round, and reactive to light.   Neck:      Thyroid: No thyromegaly.   Cardiovascular:      Rate and Rhythm: Normal rate and regular rhythm.      Heart sounds: Normal heart sounds.   Pulmonary:      Effort: Pulmonary effort is normal. No respiratory distress.      Breath sounds: Normal breath sounds. No wheezing, rhonchi or rales.   Abdominal:      General: There is no distension.      Palpations: Abdomen is soft. There is no mass.      Tenderness: There is no abdominal tenderness.      Hernia: No hernia is present.   Musculoskeletal:         General: No tenderness or deformity. Normal range of motion.      Cervical back: Normal range of motion.      Comments: Bilateral bunions   Lymphadenopathy:      Cervical: No cervical adenopathy.   Skin:     General: Skin is warm and dry.      Coloration: Skin is not pale.      Findings: No rash.   Neurological:      General: No focal deficit present.      Mental Status: She is alert and oriented to person, place, and time.      Motor: No abnormal muscle tone.      Coordination: Coordination normal.   Psychiatric:         Mood and " Affect: Mood normal.         Behavior: Behavior normal.         Thought Content: Thought content normal.         Judgment: Judgment normal.           Assessment/Plan    Diagnoses and all orders for this visit:    1. Nausea (Primary)  -     Comprehensive Metabolic Panel  -     promethazine (PHENERGAN) 25 MG tablet; Take 1 tablet by mouth Every 6 (Six) Hours As Needed for Nausea or Vomiting.  Dispense: 30 tablet; Refill: 2    2. Anemia, unspecified type  -     CBC & Differential    3. Intermittent asthma without complication, unspecified asthma severity  -     Discontinue: albuterol sulfate  (90 Base) MCG/ACT inhaler; Inhale 2 puffs Every 4 (Four) Hours As Needed for Wheezing.  Dispense: 18 g; Refill: 5  -     budesonide-formoterol (Symbicort) 80-4.5 MCG/ACT inhaler; Inhale 2 puffs Every 4 (Four) Hours As Needed (2 puffs every 4 hours prn).  Dispense: 1 each; Refill: 5  -     ipratropium-albuterol (DUO-NEB) 0.5-2.5 mg/3 ml nebulizer; Take 3 mL by nebulization Every 4 (Four) Hours As Needed for Wheezing or Shortness of Air.  Dispense: 360 mL; Refill: 2  -     Ambulatory Referral to Allergy  -     albuterol sulfate  (90 Base) MCG/ACT inhaler; Inhale 2 puffs Every 4 (Four) Hours As Needed for Wheezing.  Dispense: 18 g; Refill: 5    4. Bilateral bunions  -     Ambulatory Referral to Podiatry    Other orders  -     Prenatal Vit-Fe Fumarate-FA (prenatal vitamin 28-0.8) 28-0.8 MG tablet tablet; Take 1 tablet by mouth Daily.  Dispense: 30 tablet; Refill: 5      Patient here to establish a new primary care physician and to renew asthma medications.  Requests Phenergan for use as needed for nausea.  This was discussed including side effects.  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

## 2022-02-22 LAB
ALBUMIN SERPL-MCNC: 4.6 G/DL (ref 3.9–5)
ALBUMIN/GLOB SERPL: 2.3 {RATIO} (ref 1.2–2.2)
ALP SERPL-CCNC: 54 IU/L (ref 44–121)
ALT SERPL-CCNC: 14 IU/L (ref 0–32)
AST SERPL-CCNC: 20 IU/L (ref 0–40)
BASOPHILS # BLD AUTO: 0.1 X10E3/UL (ref 0–0.2)
BASOPHILS NFR BLD AUTO: 1 %
BILIRUB SERPL-MCNC: 0.7 MG/DL (ref 0–1.2)
BUN SERPL-MCNC: 12 MG/DL (ref 6–20)
BUN/CREAT SERPL: 16 (ref 9–23)
CALCIUM SERPL-MCNC: 9.1 MG/DL (ref 8.7–10.2)
CHLORIDE SERPL-SCNC: 109 MMOL/L (ref 96–106)
CO2 SERPL-SCNC: 20 MMOL/L (ref 20–29)
CREAT SERPL-MCNC: 0.76 MG/DL (ref 0.57–1)
EOSINOPHIL # BLD AUTO: 0.1 X10E3/UL (ref 0–0.4)
EOSINOPHIL NFR BLD AUTO: 1 %
ERYTHROCYTE [DISTWIDTH] IN BLOOD BY AUTOMATED COUNT: 12.4 % (ref 11.7–15.4)
GLOBULIN SER CALC-MCNC: 2 G/DL (ref 1.5–4.5)
GLUCOSE SERPL-MCNC: 88 MG/DL (ref 65–99)
HCT VFR BLD AUTO: 37.2 % (ref 34–46.6)
HGB BLD-MCNC: 12.7 G/DL (ref 11.1–15.9)
IMM GRANULOCYTES # BLD AUTO: 0 X10E3/UL (ref 0–0.1)
IMM GRANULOCYTES NFR BLD AUTO: 0 %
LYMPHOCYTES # BLD AUTO: 1.5 X10E3/UL (ref 0.7–3.1)
LYMPHOCYTES NFR BLD AUTO: 21 %
MCH RBC QN AUTO: 31.4 PG (ref 26.6–33)
MCHC RBC AUTO-ENTMCNC: 34.1 G/DL (ref 31.5–35.7)
MCV RBC AUTO: 92 FL (ref 79–97)
MONOCYTES # BLD AUTO: 0.5 X10E3/UL (ref 0.1–0.9)
MONOCYTES NFR BLD AUTO: 7 %
NEUTROPHILS # BLD AUTO: 5.2 X10E3/UL (ref 1.4–7)
NEUTROPHILS NFR BLD AUTO: 70 %
PLATELET # BLD AUTO: 230 X10E3/UL (ref 150–450)
POTASSIUM SERPL-SCNC: 4.2 MMOL/L (ref 3.5–5.2)
PROT SERPL-MCNC: 6.6 G/DL (ref 6–8.5)
RBC # BLD AUTO: 4.04 X10E6/UL (ref 3.77–5.28)
SODIUM SERPL-SCNC: 143 MMOL/L (ref 134–144)
WBC # BLD AUTO: 7.4 X10E3/UL (ref 3.4–10.8)